# Patient Record
Sex: FEMALE | Race: WHITE | Employment: PART TIME | URBAN - METROPOLITAN AREA
[De-identification: names, ages, dates, MRNs, and addresses within clinical notes are randomized per-mention and may not be internally consistent; named-entity substitution may affect disease eponyms.]

---

## 2017-07-21 PROBLEM — R55 SYNCOPE: Status: ACTIVE | Noted: 2017-07-21

## 2017-07-21 PROBLEM — E66.01 MORBID OBESITY DUE TO EXCESS CALORIES (HCC): Status: ACTIVE | Noted: 2017-07-21

## 2017-07-21 PROBLEM — E78.2 MIXED HYPERLIPIDEMIA: Status: ACTIVE | Noted: 2017-07-21

## 2017-07-21 PROBLEM — R00.2 PALPITATIONS: Status: ACTIVE | Noted: 2017-07-21

## 2017-07-21 PROBLEM — E11.9 TYPE 2 DIABETES MELLITUS WITHOUT COMPLICATION, WITHOUT LONG-TERM CURRENT USE OF INSULIN (HCC): Status: ACTIVE | Noted: 2017-07-21

## 2017-12-11 PROBLEM — D25.9 FIBROID UTERUS: Status: ACTIVE | Noted: 2017-12-11

## 2017-12-12 PROBLEM — D25.9 FIBROID UTERUS: Status: RESOLVED | Noted: 2017-12-11 | Resolved: 2017-12-12

## 2018-10-21 ENCOUNTER — HOSPITAL ENCOUNTER (EMERGENCY)
Age: 48
Discharge: HOME | End: 2018-10-21
Payer: MEDICAID

## 2018-10-21 ENCOUNTER — APPOINTMENT (OUTPATIENT)
Dept: CT IMAGING | Age: 48
End: 2018-10-21
Payer: MEDICAID

## 2018-10-21 VITALS
TEMPERATURE: 97.7 F | HEIGHT: 64 IN | WEIGHT: 256 LBS | HEART RATE: 96 BPM | SYSTOLIC BLOOD PRESSURE: 145 MMHG | RESPIRATION RATE: 18 BRPM | DIASTOLIC BLOOD PRESSURE: 82 MMHG | OXYGEN SATURATION: 98 % | BODY MASS INDEX: 43.71 KG/M2

## 2018-10-21 DIAGNOSIS — F07.81 POST CONCUSSION SYNDROME: Primary | ICD-10-CM

## 2018-10-21 PROCEDURE — 99283 EMERGENCY DEPT VISIT LOW MDM: CPT

## 2018-10-21 PROCEDURE — 6370000000 HC RX 637 (ALT 250 FOR IP): Performed by: PHYSICIAN ASSISTANT

## 2018-10-21 PROCEDURE — 70450 CT HEAD/BRAIN W/O DYE: CPT

## 2018-10-21 PROCEDURE — 72125 CT NECK SPINE W/O DYE: CPT

## 2018-10-21 RX ORDER — ACETAMINOPHEN 500 MG
1000 TABLET ORAL EVERY 8 HOURS PRN
Qty: 30 TABLET | Refills: 3 | Status: SHIPPED | OUTPATIENT
Start: 2018-10-21 | End: 2020-08-25 | Stop reason: ALTCHOICE

## 2018-10-21 RX ORDER — ONDANSETRON 4 MG/1
4 TABLET, ORALLY DISINTEGRATING ORAL EVERY 8 HOURS PRN
Qty: 24 TABLET | Refills: 0 | Status: SHIPPED | OUTPATIENT
Start: 2018-10-21 | End: 2020-08-25 | Stop reason: ALTCHOICE

## 2018-10-21 RX ORDER — NAPROXEN 500 MG/1
500 TABLET ORAL 2 TIMES DAILY
Qty: 30 TABLET | Refills: 0 | Status: SHIPPED | OUTPATIENT
Start: 2018-10-21 | End: 2020-08-25 | Stop reason: ALTCHOICE

## 2018-10-21 RX ORDER — ACETAMINOPHEN 500 MG
1000 TABLET ORAL ONCE
Status: COMPLETED | OUTPATIENT
Start: 2018-10-21 | End: 2018-10-21

## 2018-10-21 RX ADMIN — ACETAMINOPHEN 1000 MG: 500 TABLET, FILM COATED ORAL at 15:47

## 2018-10-21 ASSESSMENT — PAIN DESCRIPTION - PAIN TYPE: TYPE: ACUTE PAIN

## 2018-10-21 ASSESSMENT — PAIN SCALES - GENERAL
PAINLEVEL_OUTOF10: 10

## 2018-10-21 ASSESSMENT — PAIN DESCRIPTION - LOCATION: LOCATION: HEAD

## 2018-10-21 NOTE — ED PROVIDER NOTES
[facility]     Department of Emergency Medicine   ED  Provider Note  Admit Date/RoomTime: 10/21/2018  2:17 PM  ED Room: 20/20  Chief Complaint   Fall (Patient lost her balance causing her to fall and hit the back of her head, patient doesn't think that she passed out.) and Nausea    History of Present Illness   Source of history provided by:  patient. History/Exam Limitations: none. Carmella Dee is a 46 y.o. old female with a past medical history of:   Past Medical History:   Diagnosis Date    Diabetes mellitus ([facility] [address]     Hyperlipidemia     Kidney stone     Syncopal episodes     vasodepressor mediated origin      presents to the emergency department by private vehicle, for head injury which occured 1 day(s) prior to arrival.  Mechanism of injury/pain: slipped on water and hit the back of her head. Loss of consciousness did not occur. The injury has been associated with headache, nausea, vomiting, dizziness and ataxia and denies any chest pain, palpitations, vertigo, blurred vision, diplopia, loss of vision, slurred speech, focal weakness, focal sensory loss, neck pain, incontinence or seizure activity. Previous head injury: no.  Since onset the symptoms have been severe in degree. Her pain is aggraveated by \"everything\" and relieved by nothing. She has a history of no anticoagulation use. The patients tetanus status is up to date. ROS    Pertinent positives and negatives are stated within HPI, all other systems reviewed and are negative. Past Surgical History:  has a past surgical history that includes Cholecystectomy; Appendectomy; Tubal ligation (1991); other surgical history (12/11/2017); and Hysterectomy. Social History:  reports that she has never smoked. She has never used smokeless tobacco. She reports that she drinks alcohol. She reports that she does not use drugs. Family History: family history includes Bipolar Disorder in her brother; Heart Disease in her mother;  Other in her

## 2020-08-25 ENCOUNTER — APPOINTMENT (OUTPATIENT)
Dept: GENERAL RADIOLOGY | Age: 50
End: 2020-08-25
Payer: COMMERCIAL

## 2020-08-25 ENCOUNTER — HOSPITAL ENCOUNTER (EMERGENCY)
Age: 50
Discharge: HOME | End: 2020-08-25
Attending: EMERGENCY MEDICINE
Payer: COMMERCIAL

## 2020-08-25 VITALS
SYSTOLIC BLOOD PRESSURE: 151 MMHG | HEART RATE: 74 BPM | TEMPERATURE: 98 F | DIASTOLIC BLOOD PRESSURE: 96 MMHG | BODY MASS INDEX: 37.56 KG/M2 | RESPIRATION RATE: 16 BRPM | HEIGHT: 64 IN | WEIGHT: 220 LBS | OXYGEN SATURATION: 96 %

## 2020-08-25 LAB
ALBUMIN SERPL-MCNC: 3.8 G/DL (ref 3.5–5.2)
ALP BLD-CCNC: 105 U/L (ref 35–104)
ALT SERPL-CCNC: 21 U/L (ref 0–32)
ANION GAP SERPL CALCULATED.3IONS-SCNC: 11 MMOL/L (ref 7–16)
AST SERPL-CCNC: 26 U/L (ref 0–31)
BASOPHILS ABSOLUTE: 0.06 E9/L (ref 0–0.2)
BASOPHILS RELATIVE PERCENT: 0.7 % (ref 0–2)
BILIRUB SERPL-MCNC: 0.2 MG/DL (ref 0–1.2)
BUN BLDV-MCNC: 13 MG/DL (ref 6–20)
CALCIUM SERPL-MCNC: 9 MG/DL (ref 8.6–10.2)
CHLORIDE BLD-SCNC: 102 MMOL/L (ref 98–107)
CO2: 25 MMOL/L (ref 22–29)
CREAT SERPL-MCNC: 0.6 MG/DL (ref 0.5–1)
EOSINOPHILS ABSOLUTE: 0.45 E9/L (ref 0.05–0.5)
EOSINOPHILS RELATIVE PERCENT: 5.3 % (ref 0–6)
GFR AFRICAN AMERICAN: >60
GFR NON-AFRICAN AMERICAN: >60 ML/MIN/1.73
GLUCOSE BLD-MCNC: 217 MG/DL (ref 74–99)
HCT VFR BLD CALC: 46.7 % (ref 34–48)
HEMOGLOBIN: 15 G/DL (ref 11.5–15.5)
IMMATURE GRANULOCYTES #: 0.03 E9/L
IMMATURE GRANULOCYTES %: 0.4 % (ref 0–5)
LACTIC ACID: 2.2 MMOL/L (ref 0.5–2.2)
LYMPHOCYTES ABSOLUTE: 2.48 E9/L (ref 1.5–4)
LYMPHOCYTES RELATIVE PERCENT: 29.5 % (ref 20–42)
MCH RBC QN AUTO: 30.9 PG (ref 26–35)
MCHC RBC AUTO-ENTMCNC: 32.1 % (ref 32–34.5)
MCV RBC AUTO: 96.1 FL (ref 80–99.9)
MONOCYTES ABSOLUTE: 0.38 E9/L (ref 0.1–0.95)
MONOCYTES RELATIVE PERCENT: 4.5 % (ref 2–12)
NEUTROPHILS ABSOLUTE: 5.02 E9/L (ref 1.8–7.3)
NEUTROPHILS RELATIVE PERCENT: 59.6 % (ref 43–80)
PDW BLD-RTO: 12.9 FL (ref 11.5–15)
PLATELET # BLD: 287 E9/L (ref 130–450)
PMV BLD AUTO: 10.6 FL (ref 7–12)
POTASSIUM REFLEX MAGNESIUM: 4.6 MMOL/L (ref 3.5–5)
RBC # BLD: 4.86 E12/L (ref 3.5–5.5)
SEDIMENTATION RATE, ERYTHROCYTE: 1 MM/HR (ref 0–20)
SODIUM BLD-SCNC: 138 MMOL/L (ref 132–146)
TOTAL PROTEIN: 7 G/DL (ref 6.4–8.3)
WBC # BLD: 8.4 E9/L (ref 4.5–11.5)

## 2020-08-25 PROCEDURE — 87040 BLOOD CULTURE FOR BACTERIA: CPT

## 2020-08-25 PROCEDURE — 36415 COLL VENOUS BLD VENIPUNCTURE: CPT

## 2020-08-25 PROCEDURE — 85651 RBC SED RATE NONAUTOMATED: CPT

## 2020-08-25 PROCEDURE — 85025 COMPLETE CBC W/AUTO DIFF WBC: CPT

## 2020-08-25 PROCEDURE — 80053 COMPREHEN METABOLIC PANEL: CPT

## 2020-08-25 PROCEDURE — 6370000000 HC RX 637 (ALT 250 FOR IP): Performed by: PHYSICIAN ASSISTANT

## 2020-08-25 PROCEDURE — 99284 EMERGENCY DEPT VISIT MOD MDM: CPT

## 2020-08-25 PROCEDURE — 73630 X-RAY EXAM OF FOOT: CPT

## 2020-08-25 PROCEDURE — 83605 ASSAY OF LACTIC ACID: CPT

## 2020-08-25 RX ORDER — M-VIT,TX,IRON,MINS/CALC/FOLIC 27MG-0.4MG
1 TABLET ORAL DAILY
COMMUNITY

## 2020-08-25 RX ORDER — OXYCODONE HYDROCHLORIDE AND ACETAMINOPHEN 5; 325 MG/1; MG/1
1 TABLET ORAL ONCE
Status: COMPLETED | OUTPATIENT
Start: 2020-08-25 | End: 2020-08-25

## 2020-08-25 RX ORDER — MORPHINE SULFATE 4 MG/ML
4 INJECTION, SOLUTION INTRAMUSCULAR; INTRAVENOUS ONCE
Status: DISCONTINUED | OUTPATIENT
Start: 2020-08-25 | End: 2020-08-25

## 2020-08-25 RX ORDER — NAPROXEN 250 MG/1
250 TABLET ORAL 2 TIMES DAILY WITH MEALS
COMMUNITY

## 2020-08-25 RX ORDER — SULFAMETHOXAZOLE AND TRIMETHOPRIM 800; 160 MG/1; MG/1
1 TABLET ORAL 2 TIMES DAILY
Qty: 20 TABLET | Refills: 0 | Status: SHIPPED | OUTPATIENT
Start: 2020-08-25 | End: 2020-09-04

## 2020-08-25 RX ORDER — CEPHALEXIN 500 MG/1
500 CAPSULE ORAL 4 TIMES DAILY
Qty: 40 CAPSULE | Refills: 0 | Status: SHIPPED | OUTPATIENT
Start: 2020-08-25 | End: 2020-09-04

## 2020-08-25 RX ORDER — ONDANSETRON 2 MG/ML
4 INJECTION INTRAMUSCULAR; INTRAVENOUS ONCE
Status: DISCONTINUED | OUTPATIENT
Start: 2020-08-25 | End: 2020-08-25

## 2020-08-25 RX ORDER — PAROXETINE 10 MG/1
15 TABLET, FILM COATED ORAL EVERY MORNING
COMMUNITY

## 2020-08-25 RX ORDER — ROSUVASTATIN CALCIUM 10 MG/1
10 TABLET, COATED ORAL NIGHTLY
COMMUNITY

## 2020-08-25 RX ORDER — GABAPENTIN 300 MG/1
900 CAPSULE ORAL 3 TIMES DAILY
COMMUNITY

## 2020-08-25 RX ORDER — TRAMADOL HYDROCHLORIDE 50 MG/1
50 TABLET ORAL EVERY 4 HOURS PRN
Qty: 18 TABLET | Refills: 0 | Status: SHIPPED | OUTPATIENT
Start: 2020-08-25 | End: 2020-08-28

## 2020-08-25 RX ADMIN — OXYCODONE HYDROCHLORIDE AND ACETAMINOPHEN 1 TABLET: 5; 325 TABLET ORAL at 13:11

## 2020-08-25 ASSESSMENT — PAIN SCALES - GENERAL
PAINLEVEL_OUTOF10: 10
PAINLEVEL_OUTOF10: 9
PAINLEVEL_OUTOF10: 8

## 2020-08-25 ASSESSMENT — PAIN DESCRIPTION - ORIENTATION: ORIENTATION: RIGHT

## 2020-08-25 ASSESSMENT — PAIN DESCRIPTION - LOCATION: LOCATION: FOOT

## 2020-08-25 NOTE — ED PROVIDER NOTES
ED Attending  CC: No    HPI:  8/25/20,   Time: 11:41 AM EDT       Carmelo Stanford is a 44 y.o. female presenting to the ED for right great toe pain, beginning 1 week ago. The complaint has been persistent, moderate in severity, and worsened by palpation and walking. Patient states that she picked a piece of dead skin off of the plantar aspect of her right great toe approximately a week ago. Since that time she has been having worsening swelling, erythema, and pain to the area. Patient states that now that the pain has traveled to the second toe and up the dorsal aspect of the first metatarsal.  She was evaluated by her PCP today who recommended ED evaluation for hospital admission IV antibiotics to rule out osteomyelitis. She has been afebrile at home without recent travel or sick contacts. No drainage from the area. Patient denies all other symptoms at this time. Review of Systems:   Pertinent positives and negatives are stated within HPI, all other systems reviewed and are negative.          --------------------------------------------- PAST HISTORY ---------------------------------------------  Past Medical History:  has a past medical history of Diabetes mellitus ([facility] [address] Hyperlipidemia, Kidney stone, and Syncopal episodes. Past Surgical History:  has a past surgical history that includes Cholecystectomy; Appendectomy; Tubal ligation (1991); other surgical history (12/11/2017); and Hysterectomy. Social History:  reports that she has never smoked. She has never used smokeless tobacco. She reports current alcohol use. She reports that she does not use drugs. Family History: family history includes Bipolar Disorder in her brother; Heart Disease in her mother; Other in her brother and mother; Schizophrenia in her brother. The patients home medications have been reviewed. Allergies: Patient has no known allergies.         ---------------------------------------------------PHYSICAL EXAM--------------------------------------    Constitutional/General: Alert and oriented x3, well appearing, non toxic in NAD  Head: Normocephalic and atraumatic  Eyes: PERRL, EOMI, conjunctive normal, sclera non icteric  Mouth: Oropharynx clear, handling secretions, no trismus, no asymmetry of the posterior oropharynx or uvular edema  Neck: Supple, full ROM, non tender to palpation in the midline, no stridor, no crepitus, no meningeal signs  Respiratory: Lungs clear to auscultation bilaterally, no wheezes, rales, or rhonchi. Not in respiratory distress  Cardiovascular:  Regular rate. Regular rhythm. No murmurs, gallops, or rubs. 2+ distal pulses  Chest: No chest wall tenderness  GI:  Abdomen Soft, Non tender, Non distended. +BS. No organomegaly, no palpable masses,  No rebound, guarding, or rigidity. Musculoskeletal: Moves all extremities x 4. Warm and well perfused, no clubbing, cyanosis, or edema. Capillary refill <3 seconds, open wound to the plantar aspect of the medial right great toe without drainage however there is surrounding erythema and swelling extending to the MTP joint  Integument: skin warm and dry. No rashes. Lymphatic: no lymphadenopathy noted  Neurologic: GCS 15, no focal deficits, symmetric strength 5/5 in the upper and lower extremities bilaterally  Psychiatric: Normal Affect    -------------------------------------------------- RESULTS -------------------------------------------------  I have personally reviewed all laboratory and imaging results for this patient. Results are listed below.      LABS:  Results for orders placed or performed during the hospital encounter of 08/25/20   CBC Auto Differential   Result Value Ref Range    WBC 8.4 4.5 - 11.5 E9/L    RBC 4.86 3.50 - 5.50 E12/L    Hemoglobin 15.0 11.5 - 15.5 g/dL    Hematocrit 46.7 34.0 - 48.0 %    MCV 96.1 80.0 - 99.9 fL    MCH 30.9 26.0 - 35.0 pg    MCHC 32.1 32.0 - 34.5 %    RDW 12.9 11.5 - 15.0 fL    Platelets 413 380 - 491 E9/L MPV 10.6 7.0 - 12.0 fL    Neutrophils % 59.6 43.0 - 80.0 %    Immature Granulocytes % 0.4 0.0 - 5.0 %    Lymphocytes % 29.5 20.0 - 42.0 %    Monocytes % 4.5 2.0 - 12.0 %    Eosinophils % 5.3 0.0 - 6.0 %    Basophils % 0.7 0.0 - 2.0 %    Neutrophils Absolute 5.02 1.80 - 7.30 E9/L    Immature Granulocytes # 0.03 E9/L    Lymphocytes Absolute 2.48 1.50 - 4.00 E9/L    Monocytes Absolute 0.38 0.10 - 0.95 E9/L    Eosinophils Absolute 0.45 0.05 - 0.50 E9/L    Basophils Absolute 0.06 0.00 - 0.20 E9/L   Comprehensive Metabolic Panel w/ Reflex to MG   Result Value Ref Range    Sodium 138 132 - 146 mmol/L    Potassium reflex Magnesium 4.6 3.5 - 5.0 mmol/L    Chloride 102 98 - 107 mmol/L    CO2 25 22 - 29 mmol/L    Anion Gap 11 7 - 16 mmol/L    Glucose 217 (H) 74 - 99 mg/dL    BUN 13 6 - 20 mg/dL    CREATININE 0.6 0.5 - 1.0 mg/dL    GFR Non-African American >60 >=60 mL/min/1.73    GFR African American >60     Calcium 9.0 8.6 - 10.2 mg/dL    Total Protein 7.0 6.4 - 8.3 g/dL    Alb 3.8 3.5 - 5.2 g/dL    Total Bilirubin 0.2 0.0 - 1.2 mg/dL    Alkaline Phosphatase 105 (H) 35 - 104 U/L    ALT 21 0 - 32 U/L    AST 26 0 - 31 U/L   Lactic Acid, Plasma   Result Value Ref Range    Lactic Acid 2.2 0.5 - 2.2 mmol/L   Sedimentation Rate   Result Value Ref Range    Sed Rate 1 0 - 20 mm/Hr       RADIOLOGY:  Interpreted by Radiologist.  XR FOOT RIGHT (MIN 3 VIEWS)   Final Result   No radiographic evidence of osteomyelitis.                 ------------------------- NURSING NOTES AND VITALS REVIEWED ---------------------------   The nursing notes within the ED encounter and vital signs as below have been reviewed by myself. BP (!) 151/96   Pulse 74   Temp 98 °F (36.7 °C) (Oral)   Resp 16   Ht 5' 4\" (1.626 m)   Wt 220 lb (99.8 kg)   LMP 11/24/2017   SpO2 96%   BMI 37.76 kg/m²   Oxygen Saturation Interpretation: Normal    The patients available past medical records and past encounters were reviewed. ------------------------------ ED COURSE/MEDICAL DECISION MAKING----------------------  Medications   oxyCODONE-acetaminophen (PERCOCET) 5-325 MG per tablet 1 tablet (1 tablet Oral Given 8/25/20 1311)         ED COURSE:  ED Course as of Aug 25 1459   Tue Aug 25, 2020   1451 Discussed patient with Dr. Marlen Aguilar. Agreeable to outpatient antibiotics prior to hospital admission. Requests podiatry follow up. [MS]      ED Course User Index  [MS] Carolyn Elias [place]       Medical Decision Making:    Patient is a 47-year-old female presenting to the emergency department with a right great toe wound. Patient does not have leukocytosis, lactate negative, sed rate is 1. Imaging studies do not reveal any evidence of osteomyelitis. At this time I believe this is a localized cellulitis and can be treated on an outpatient basis. Patient's PCP was also contacted and is agreeable to this plan. Podiatry follow-up provided to the patient. Advised to follow-up with podiatry as discussed and return to the emergency department with any new or worsening symptoms. Will initiate Bactrim and Keflex for antibiotic coverage and tramadol as needed for pain. Patient voiced understanding is agreeable to the above treatment plan. This patient's ED course included: a personal history and physicial examination and multiple bedside re-evaluations    This patient has remained hemodynamically stable during their ED course. Re-Evaluations:             Re-evaluation. Patients symptoms are improving    Re-examination  8/25/20   11:41 AM EDT          Vital Signs:   Vitals:    08/25/20 1114 08/25/20 1447   BP: (!) 180/115 (!) 151/96   Pulse: 108 74   Resp: 18 16   Temp: 98.1 °F (36.7 °C) 98 °F (36.7 °C)   TempSrc: Infrared Oral   SpO2: 98% 96%   Weight: 220 lb (99.8 kg)    Height: 5' 4\" (1.626 m)        Consultations:             Dr. Marlen Aguilar: agreeable to outpatient treatment with PO antibiotics. Podiatry follow up.     Critical Care: None        Counseling: The emergency provider has spoken with the patient and discussed todays results, in addition to providing specific details for the plan of care and counseling regarding the diagnosis and prognosis. Questions are answered at this time and they are agreeable with the plan.       --------------------------------- IMPRESSION AND DISPOSITION ---------------------------------    IMPRESSION  1. Cellulitis of great toe of right foot        DISPOSITION  Disposition: Discharge to home  Patient condition is stable    NOTE: This report was transcribed using voice recognition software.  Every effort was made to ensure accuracy; however, inadvertent computerized transcription errors may be present        AnyLm Sullivan  08/25/20 1195

## 2020-08-25 NOTE — ED NOTES
Pt. C/o R. Great toe pain and redness for the past week. Pt. states they picked a piece of skin off of there toe a week ago.      Ange Bailey RN  08/25/20 3887

## 2020-08-30 LAB
BLOOD CULTURE, ROUTINE: NORMAL
CULTURE, BLOOD 2: NORMAL

## 2022-12-30 ENCOUNTER — HOSPITAL ENCOUNTER (EMERGENCY)
Age: 52
Discharge: HOME | End: 2022-12-30
Payer: COMMERCIAL

## 2022-12-30 VITALS
OXYGEN SATURATION: 95 % | DIASTOLIC BLOOD PRESSURE: 84 MMHG | SYSTOLIC BLOOD PRESSURE: 125 MMHG | HEIGHT: 64 IN | RESPIRATION RATE: 18 BRPM | TEMPERATURE: 98 F | HEART RATE: 106 BPM | WEIGHT: 20 LBS | BODY MASS INDEX: 3.41 KG/M2

## 2022-12-30 DIAGNOSIS — J02.9 ACUTE PHARYNGITIS, UNSPECIFIED ETIOLOGY: ICD-10-CM

## 2022-12-30 DIAGNOSIS — J06.9 ACUTE UPPER RESPIRATORY INFECTION: Primary | ICD-10-CM

## 2022-12-30 LAB
INFLUENZA A BY PCR: NOT DETECTED
INFLUENZA B BY PCR: NOT DETECTED
SARS-COV-2, NAAT: NOT DETECTED

## 2022-12-30 PROCEDURE — 87635 SARS-COV-2 COVID-19 AMP PRB: CPT

## 2022-12-30 PROCEDURE — 6370000000 HC RX 637 (ALT 250 FOR IP): Performed by: NURSE PRACTITIONER

## 2022-12-30 PROCEDURE — 87502 INFLUENZA DNA AMP PROBE: CPT

## 2022-12-30 PROCEDURE — 99211 OFF/OP EST MAY X REQ PHY/QHP: CPT

## 2022-12-30 RX ORDER — AMOXICILLIN 250 MG/1
500 CAPSULE ORAL ONCE
Status: COMPLETED | OUTPATIENT
Start: 2022-12-30 | End: 2022-12-30

## 2022-12-30 RX ORDER — AMOXICILLIN 500 MG/1
500 CAPSULE ORAL 3 TIMES DAILY
Qty: 30 CAPSULE | Refills: 0 | Status: SHIPPED | OUTPATIENT
Start: 2022-12-30 | End: 2023-01-09

## 2022-12-30 RX ADMIN — AMOXICILLIN 500 MG: 250 CAPSULE ORAL at 21:17

## 2022-12-30 ASSESSMENT — PAIN SCALES - GENERAL: PAINLEVEL_OUTOF10: 0

## 2022-12-30 ASSESSMENT — PAIN - FUNCTIONAL ASSESSMENT: PAIN_FUNCTIONAL_ASSESSMENT: NONE - DENIES PAIN

## 2022-12-31 NOTE — ED PROVIDER NOTES
Department of Emergency . [facility] [address] [facility]  Provider Note  Admit Date/RoomTime: 2022  8:03 PM  Room:   NAME: Tommy Tijerina  : 1970  MRN: [id number]     Chief Complaint:  Cough, Pharyngitis, Fever, Fatigue, Diarrhea (Cough, sore throat, fatigue, fever started Tuesday ), and Nausea    History of Present Illness       Tommy Tijerina is a 45 y.o. old female who presents to the urgent care center by private vehicle for relation. She is complaining of a cough sore throat fever fatigue and diarrhea she has been sick for 3 days. She said her friend tested positive for COVID. They were on vacation together apparently. SHe tested for COVID at home and it was negative. S He is not complaining of pain over her sinuses or pressure over her sinuses. Pertinent positives and negatives are stated within HPI, all other systems reviewed and are negative. Past Surgical History:   Procedure Laterality Date    APPENDECTOMY      CHOLECYSTECTOMY      HYSTERECTOMY (CERVIX STATUS UNKNOWN)      OTHER SURGICAL HISTORY  2017    robotic assisted laparoscopic total abdominal hysterectomy with bilateral salpinectomy and cystoscopy    TUBAL LIGATION     Social History:  reports that she has never smoked. She has never used smokeless tobacco. She reports current alcohol use. She reports that she does not use drugs. Family History: family history includes Bipolar Disorder in her brother; Heart Disease in her mother; Other in her brother and mother; Schizophrenia in her brother. Allergies: Patient has no known allergies.     Physical Exam            ED Triage Vitals [22]   BP Temp Temp Source Heart Rate Resp SpO2 Height Weight   125/84 98 °F (36.7 °C) Infrared (!) 106 18 95 % 5' 4\" (1.626 m) 20 lb (9.072 kg)      Oxygen Saturation Interpretation: Normal.    Constitutional:  Alert, appears well, no distress  Ears:  External Ears: Bilateral normal. TM's & External Canals: normal appearance, normal TMs bilaterally. Nose:   There is no discharge, swelling or lesions noted. Sinuses: no bilateral maxillary sinus tenderness. no bilateral frontal sinus tenderness. Mouth:  normal tongue and buccal mucosa. Throat: mild erythema or exudates noted. Teeth and gums normal..  Airway patent. Neck/Lymphatics:  Neck Supple. Respiratory:   Breath sounds: bilateral normal.  Lung sounds: normal.   CV:  Regular rate and rhythm, normal heart sounds, without pathological murmurs, ectopy, gallops, or rubs. GI:  Abdomen Soft, nontender, good bowel sounds. No firm or pulsatile mass. Integument:  Normal turgor. Warm, dry, without visible rash. Neurological:  Oriented. Motor functions intact. Lab / Imaging Results   (All laboratory and radiology results have been personally reviewed by myself)  Labs:  Results for orders placed or performed during the hospital encounter of 12/30/22   Rapid influenza A/B antigens    Specimen: Nares   Result Value Ref Range    Influenza A by PCR Not Detected Not Detected    Influenza B by PCR Not Detected Not Detected   COVID-19, Rapid    Specimen: Nasopharyngeal Swab   Result Value Ref Range    SARS-CoV-2, NAAT Not Detected Not Detected     Imaging: All Radiology results interpreted by Radiologist unless otherwise noted. No orders to display     ED Course / Medical Decision Making   Medications - No data to display       MDM:   She has been sick for 3 days she says she has had a fever sore throat body aches cough some diarrhea does not have any abdominal pain is not complaining of chest pain. Is not complaining of shortness of breath I did recheck her sat when I was in the room it was 97% and her heart rate was 95. She tested for COVID at home but does not think she did have a proper test I did test her for influenza and it was negative I then tested her for COVID and it was negative.   I did not test for strep throat but she said that she her throat is very sore and she has had a fever so I did cover her for strep throat with amoxicillin advised her to follow-up with her doctor if anything worsen she should get reevaluated    1. Acute upper respiratory infection    2. Acute pharyngitis, unspecified etiology      Plan   Discharge to home and advised to contact Teodroo Car., DO  [address] [place] [address] [phone]    Schedule an appointment as soon as possible for a visit    Patient condition is good    New Medications     New Prescriptions    AMOXICILLIN (AMOXIL) 500 MG CAPSULE    Take 1 capsule by mouth 3 times daily for 10 days     Electronically signed by RAINE Serrato CNP   DD: 12/30/22  **This report was transcribed using voice recognition software. Every effort was made to ensure accuracy; however, inadvertent computerized transcription errors may be present.   END OF ED PROVIDER NOTE      RAINE Serrato CNP  12/30/22 2506

## 2023-03-08 ENCOUNTER — HOSPITAL ENCOUNTER (OUTPATIENT)
Dept: CT IMAGING | Age: 53
Discharge: HOME | End: 2023-03-08
Payer: COMMERCIAL

## 2023-03-08 DIAGNOSIS — R10.10 UPPER ABDOMINAL PAIN: ICD-10-CM

## 2023-03-08 LAB
BUN BLDV-MCNC: 13 MG/DL (ref 6–20)
CREAT SERPL-MCNC: 0.7 MG/DL (ref 0.5–1)
GFR SERPL CREATININE-BSD FRML MDRD: >60 ML/MIN/1.73

## 2023-03-08 PROCEDURE — 36415 COLL VENOUS BLD VENIPUNCTURE: CPT

## 2023-03-08 PROCEDURE — 74177 CT ABD & PELVIS W/CONTRAST: CPT

## 2023-03-08 PROCEDURE — 84520 ASSAY OF UREA NITROGEN: CPT

## 2023-03-08 PROCEDURE — 82565 ASSAY OF CREATININE: CPT

## 2023-03-08 PROCEDURE — 6360000004 HC RX CONTRAST MEDICATION: Performed by: RADIOLOGY

## 2023-03-08 RX ADMIN — IOPAMIDOL 75 ML: 755 INJECTION, SOLUTION INTRAVENOUS at 13:18

## 2023-03-08 RX ADMIN — IOPAMIDOL 18 ML: 755 INJECTION, SOLUTION INTRAVENOUS at 13:18

## 2024-10-01 ENCOUNTER — HOSPITAL ENCOUNTER (OUTPATIENT)
Dept: WOUND CARE | Age: 54
Discharge: HOME | End: 2024-10-01
Payer: COMMERCIAL

## 2024-10-01 VITALS
RESPIRATION RATE: 16 BRPM | SYSTOLIC BLOOD PRESSURE: 117 MMHG | WEIGHT: 204 LBS | HEART RATE: 75 BPM | DIASTOLIC BLOOD PRESSURE: 75 MMHG | BODY MASS INDEX: 34.83 KG/M2 | TEMPERATURE: 96.6 F | HEIGHT: 64 IN

## 2024-10-01 DIAGNOSIS — B35.6 TINEA CRURIS: ICD-10-CM

## 2024-10-01 DIAGNOSIS — R09.89 DECREASED DORSALIS PEDIS PULSE: ICD-10-CM

## 2024-10-01 DIAGNOSIS — E11.621 DIABETIC ULCER OF TOE OF RIGHT FOOT ASSOCIATED WITH TYPE 2 DIABETES MELLITUS, WITH FAT LAYER EXPOSED (HCC): Primary | ICD-10-CM

## 2024-10-01 DIAGNOSIS — B35.3 TINEA PEDIS OF BOTH FEET: ICD-10-CM

## 2024-10-01 DIAGNOSIS — L97.512 DIABETIC ULCER OF TOE OF RIGHT FOOT ASSOCIATED WITH TYPE 2 DIABETES MELLITUS, WITH FAT LAYER EXPOSED (HCC): Primary | ICD-10-CM

## 2024-10-01 PROBLEM — R00.2 PALPITATIONS: Status: RESOLVED | Noted: 2017-07-21 | Resolved: 2024-10-01

## 2024-10-01 PROBLEM — R55 SYNCOPE: Status: RESOLVED | Noted: 2017-07-21 | Resolved: 2024-10-01

## 2024-10-01 PROCEDURE — 99204 OFFICE O/P NEW MOD 45 MIN: CPT | Performed by: SURGERY

## 2024-10-01 PROCEDURE — 99203 OFFICE O/P NEW LOW 30 MIN: CPT

## 2024-10-01 PROCEDURE — 11042 DBRDMT SUBQ TIS 1ST 20SQCM/<: CPT | Performed by: SURGERY

## 2024-10-01 PROCEDURE — 11042 DBRDMT SUBQ TIS 1ST 20SQCM/<: CPT

## 2024-10-01 RX ORDER — LIDOCAINE HYDROCHLORIDE 40 MG/ML
SOLUTION TOPICAL ONCE
OUTPATIENT
Start: 2024-10-01 | End: 2024-10-01

## 2024-10-01 RX ORDER — TIRZEPATIDE 10 MG/.5ML
10 INJECTION, SOLUTION SUBCUTANEOUS WEEKLY
COMMUNITY
Start: 2024-09-12

## 2024-10-01 RX ORDER — BACITRACIN ZINC AND POLYMYXIN B SULFATE 500; 1000 [USP'U]/G; [USP'U]/G
OINTMENT TOPICAL ONCE
OUTPATIENT
Start: 2024-10-01 | End: 2024-10-01

## 2024-10-01 RX ORDER — MUPIROCIN 20 MG/G
OINTMENT TOPICAL ONCE
OUTPATIENT
Start: 2024-10-01 | End: 2024-10-01

## 2024-10-01 RX ORDER — TERBINAFINE HYDROCHLORIDE 250 MG/1
250 TABLET ORAL DAILY
Qty: 20 TABLET | Refills: 0 | Status: SHIPPED | OUTPATIENT
Start: 2024-10-01 | End: 2024-10-21

## 2024-10-01 RX ORDER — ESCITALOPRAM OXALATE 10 MG/1
20 TABLET ORAL DAILY
COMMUNITY

## 2024-10-01 RX ORDER — LIDOCAINE HYDROCHLORIDE 20 MG/ML
JELLY TOPICAL ONCE
OUTPATIENT
Start: 2024-10-01 | End: 2024-10-01

## 2024-10-01 RX ORDER — DOXYCYCLINE 100 MG/1
100 CAPSULE ORAL 2 TIMES DAILY
COMMUNITY
Start: 2024-09-23 | End: 2024-10-03

## 2024-10-01 RX ORDER — LIDOCAINE 50 MG/G
OINTMENT TOPICAL ONCE
OUTPATIENT
Start: 2024-10-01 | End: 2024-10-01

## 2024-10-01 RX ORDER — BACITRACIN ZINC 500 [USP'U]/G
OINTMENT TOPICAL ONCE
OUTPATIENT
Start: 2024-10-01 | End: 2024-10-01

## 2024-10-01 RX ORDER — OMEPRAZOLE 40 MG/1
40 CAPSULE, DELAYED RELEASE ORAL DAILY
COMMUNITY

## 2024-10-01 RX ORDER — NEOMYCIN/BACITRACIN/POLYMYXINB 3.5-400-5K
OINTMENT (GRAM) TOPICAL ONCE
OUTPATIENT
Start: 2024-10-01 | End: 2024-10-01

## 2024-10-01 RX ORDER — GENTAMICIN SULFATE 1 MG/G
OINTMENT TOPICAL ONCE
OUTPATIENT
Start: 2024-10-01 | End: 2024-10-01

## 2024-10-01 RX ORDER — TRIAMCINOLONE ACETONIDE 1 MG/G
OINTMENT TOPICAL ONCE
OUTPATIENT
Start: 2024-10-01 | End: 2024-10-01

## 2024-10-01 RX ORDER — CLOBETASOL PROPIONATE 0.5 MG/G
OINTMENT TOPICAL ONCE
OUTPATIENT
Start: 2024-10-01 | End: 2024-10-01

## 2024-10-01 RX ORDER — SILVER SULFADIAZINE 10 MG/G
CREAM TOPICAL ONCE
OUTPATIENT
Start: 2024-10-01 | End: 2024-10-01

## 2024-10-01 RX ORDER — SODIUM CHLOR/HYPOCHLOROUS ACID 0.033 %
SOLUTION, IRRIGATION IRRIGATION ONCE
OUTPATIENT
Start: 2024-10-01 | End: 2024-10-01

## 2024-10-01 RX ORDER — BETAMETHASONE DIPROPIONATE 0.5 MG/G
CREAM TOPICAL ONCE
OUTPATIENT
Start: 2024-10-01 | End: 2024-10-01

## 2024-10-01 RX ORDER — LIDOCAINE 40 MG/G
CREAM TOPICAL ONCE
OUTPATIENT
Start: 2024-10-01 | End: 2024-10-01

## 2024-10-01 ASSESSMENT — PAIN DESCRIPTION - ORIENTATION: ORIENTATION: RIGHT

## 2024-10-01 ASSESSMENT — PAIN DESCRIPTION - DESCRIPTORS: DESCRIPTORS: NAGGING;THROBBING

## 2024-10-01 ASSESSMENT — PAIN DESCRIPTION - LOCATION: LOCATION: FOOT

## 2024-10-01 ASSESSMENT — PAIN SCALES - GENERAL: PAINLEVEL_OUTOF10: 7

## 2024-10-01 NOTE — PLAN OF CARE
Problem: Pain  Goal: Verbalizes/displays adequate comfort level or baseline comfort level  Outcome: Progressing     Problem: Cognitive:  Goal: Knowledge of wound care  Description: Knowledge of wound care  Outcome: Progressing  Goal: Understands risk factors for wounds  Description: Understands risk factors for wounds  Outcome: Progressing     Problem: Wound:  Goal: Will show signs of wound healing; wound closure and no evidence of infection  Description: Will show signs of wound healing; wound closure and no evidence of infection  Outcome: Progressing     Problem: Blood Glucose:  Goal: Ability to maintain appropriate glucose levels will improve  Description: Ability to maintain appropriate glucose levels will improve  Outcome: Progressing

## 2024-10-01 NOTE — DISCHARGE INSTRUCTIONS
Visit Discharge/Physician Orders    Discharge condition: Stable    Assessment of pain at discharge: yes    Anesthetic used: lido 4%    Discharge to: Home    Left via:Private automobile    Accompanied by: self    ECF/HHA: [facility]    Dressing Orders: To right foot wound- Cleanse with saline, apply zara, ABD pad and kerlix. Change daily.    Treatment Orders: Eat a diet high in protein and vitamin C. Take a multiple vitamin daily unless contraindicated.  Blood work ordered, have completed ASAP  Arterial and vascular studies ordered, to be scheduled   Miconazole Ointment prescription sent to pharmacy (may obtain on amazon, less expensive)- apply to feet, toes, and legs- avoiding wounds twice a day for one month   Lamisil tablets prescription sent to pharmacy- take by mouth as prescribed.     [facility] followup visit __2 weeks_______________________  (Please note your next appointment above and if you are unable to keep, kindly give a 24 hour notice. Thank you.)    Physician signature:__________________________      If you experience any of the following, please call the Wound Care Center during business hours:    * Increase in Pain  * Temperature over 101  * Increase in drainage from your wound  * Drainage with a foul odor  * Bleeding  * Increase in swelling  * Need for compression bandage changes due to slippage, breakthrough drainage.    If you need medical attention outside of the business hours of the Wound Care Centers please contact your PCP or go to the nearest emergency room.

## 2024-10-01 NOTE — PROGRESS NOTES
Wound Healing Center /Hyperbarics   History and Physical/Consultation  Vascular    Referring Physician : Felice Stack Jr.,   Mari HANKS  MEDICAL RECORD NUMBER:  [id number]  AGE: 54 y.o.   GENDER: female  : 1970  EPISODE DATE:  10/1/2024  Subjective:     Chief Complaint   Patient presents with    Wound Check         HISTORY of PRESENT ILLNESS HPI     Mari HANKS is a 54 y.o. female who presents today for wound/ulcer evaluation.   History of Wound Context:  The patient has had a wound of right foot mainly involving right great toe which was first noted approximately 6 months.  This has been treated by her pediatric surgeon, Dr. Appiah who referred the patient to wound care center for further evaluation.  On their initial visit to the wound healing center, 10/01/24, the patient has noted that the wound has not been improving.  The patient has not had similar previous wounds in the past.      Patient tells me, that she works in flip-flops, without any socks, denies any history of trauma no history of fever or chills    Patient does have history of diabetes mellitus diabetic neuropathy    Patient does not smoke    Pt is currently  on abx.  Doxycycline      Wound/Ulcer Pain Timing/Severity: constant  Quality of pain: dull, aching  Severity:  3 / 10   Modifying Factors: Pain worsens with walking  Associated Signs/Symptoms: drainage and pain    Ulcer Identification:  Ulcer Type: diabetic, non-healing/non-surgical, and neuropathic  Contributing Factors: diabetes    Diabetic/Pressure/Non Pressure Ulcers only:  Ulcer: Diabetic ulcer, fat layer exposed    If patient has diabetic lower extremity wounds  Moscoso Classification of diabetic lower extremity wounds:    Grade Description   []  0 No open wound   []  1 Superficial ulcer involving the full skin thickness   []  2 Deep ulcer involves ligament, tendon, joint capsule, or fascia  No bone involvement or abscess presence   []  3 Deep Ulcer with abcess 
SUPPLIES:     Incision 12/11/17 Abdomen Anterior (Active)   Number of days: 2485       Wound 10/01/24 Toe (Comment  which one) Right #1 right great toe (Active)   Wound Image   10/01/24 0919   Dressing Status New dressing applied;Clean;Dry;Intact 10/01/24 1025   Wound Cleansed Cleansed with saline 10/01/24 1025   Dressing/Treatment Collagen with Ag;Dry dressing 10/01/24 1025   Offloading for Diabetic Foot Ulcers Offloading ordered 10/01/24 1025   Wound Length (cm) 5.8 cm 10/01/24 0919   Wound Width (cm) 1.4 cm 10/01/24 0919   Wound Depth (cm) 0.1 cm 10/01/24 0919   Wound Surface Area (cm^2) 8.12 cm^2 10/01/24 0919   Wound Volume (cm^3) 0.812 cm^3 10/01/24 0919   Post-Procedure Length (cm) 5.8 cm 10/01/24 1005   Post-Procedure Width (cm) 1.4 cm 10/01/24 1005   Post-Procedure Depth (cm) 0.2 cm 10/01/24 1005   Post-Procedure Surface Area (cm^2) 8.12 cm^2 10/01/24 1005   Post-Procedure Volume (cm^3) 1.624 cm^3 10/01/24 1005   Wound Assessment Pink/red 10/01/24 0919   Drainage Amount Scant (moist but unmeasurable) 10/01/24 0919   Drainage Description Serosanguinous 10/01/24 0919   Odor None 10/01/24 0919   Haylee-wound Assessment Dry/flaky;Hyperkeratosis (callous) 10/01/24 0919   Margins Attached edges 10/01/24 0919   Wound Thickness Description not for Pressure Injury Full thickness 10/01/24 0919   Number of days: 0          Supplies Requested :      WOUND #: 1   PRIMARY DRESSING:  Other: Cathleen *GERMÁN   Cover and Secure with: ABD pad  Bulky roll gauze     FREQUENCY OF DRESSING CHANGES:  Daily       ADDITIONAL ITEMS:  [] Gloves Small  [x] Gloves Medium [] Gloves Large [] Gloves XLarge  [] Tape 1\" [x] Tape 2\" [] Tape 3\"  [] Medipore Tape  [x] Saline  [] Vashe  [] Skin Prep   [] Adhesive Remover   [] Cotton Tip Applicators   [x] Other: non-sterile 4x4 guaze for cleansing    Patient Wound(s) Debrided: [x] Yes if yes please add date 10/1/24   [] No    Debribement Type: Excisional/Sharp    Is the patient currently on an

## 2024-10-02 ENCOUNTER — HOSPITAL ENCOUNTER (OUTPATIENT)
Age: 54
Discharge: HOME | End: 2024-10-02
Payer: COMMERCIAL

## 2024-10-02 DIAGNOSIS — L97.512 DIABETIC ULCER OF TOE OF RIGHT FOOT ASSOCIATED WITH TYPE 2 DIABETES MELLITUS, WITH FAT LAYER EXPOSED (HCC): ICD-10-CM

## 2024-10-02 DIAGNOSIS — B35.3 TINEA PEDIS OF BOTH FEET: ICD-10-CM

## 2024-10-02 DIAGNOSIS — E11.621 DIABETIC ULCER OF TOE OF RIGHT FOOT ASSOCIATED WITH TYPE 2 DIABETES MELLITUS, WITH FAT LAYER EXPOSED (HCC): ICD-10-CM

## 2024-10-02 DIAGNOSIS — R09.89 DECREASED DORSALIS PEDIS PULSE: ICD-10-CM

## 2024-10-02 LAB
ALBUMIN SERPL-MCNC: 3.7 G/DL (ref 3.5–5.2)
ALP SERPL-CCNC: 119 U/L (ref 35–104)
ALT SERPL-CCNC: 10 U/L (ref 0–32)
ANION GAP SERPL CALCULATED.3IONS-SCNC: 11 MMOL/L (ref 7–16)
AST SERPL-CCNC: 15 U/L (ref 0–31)
BILIRUB SERPL-MCNC: 0.4 MG/DL (ref 0–1.2)
BUN SERPL-MCNC: 12 MG/DL (ref 6–20)
CALCIUM SERPL-MCNC: 9.2 MG/DL (ref 8.6–10.2)
CHLORIDE SERPL-SCNC: 102 MMOL/L (ref 98–107)
CO2 SERPL-SCNC: 30 MMOL/L (ref 22–29)
CREAT SERPL-MCNC: 0.7 MG/DL (ref 0.5–1)
ERYTHROCYTE [DISTWIDTH] IN BLOOD BY AUTOMATED COUNT: 13.4 % (ref 11.5–15)
GFR, ESTIMATED: >90 ML/MIN/1.73M2
GLUCOSE SERPL-MCNC: 121 MG/DL (ref 74–99)
HCT VFR BLD AUTO: 43.1 % (ref 34–48)
HGB BLD-MCNC: 13.5 G/DL (ref 11.5–15.5)
MCH RBC QN AUTO: 29.4 PG (ref 26–35)
MCHC RBC AUTO-ENTMCNC: 31.3 G/DL (ref 32–34.5)
MCV RBC AUTO: 93.9 FL (ref 80–99.9)
PLATELET # BLD AUTO: 309 K/UL (ref 130–450)
PMV BLD AUTO: 11.4 FL (ref 7–12)
POTASSIUM SERPL-SCNC: 3.2 MMOL/L (ref 3.5–5)
PROT SERPL-MCNC: 6.5 G/DL (ref 6.4–8.3)
RBC # BLD AUTO: 4.59 M/UL (ref 3.5–5.5)
SODIUM SERPL-SCNC: 143 MMOL/L (ref 132–146)
WBC OTHER # BLD: 8.1 K/UL (ref 4.5–11.5)

## 2024-10-02 PROCEDURE — 85027 COMPLETE CBC AUTOMATED: CPT

## 2024-10-02 PROCEDURE — 80053 COMPREHEN METABOLIC PANEL: CPT

## 2024-10-02 PROCEDURE — 36415 COLL VENOUS BLD VENIPUNCTURE: CPT

## 2024-10-07 NOTE — PROGRESS NOTES
Patient's lab work was reviewed, potassium 3.2, prescription sent to the pharmacy, 25 mEq, twice daily for 7 days  Will request the wound care, to call the patient to her The prescription, also follow-up on potassium level with her PCP next week

## 2024-10-07 NOTE — PROGRESS NOTES
Called Deandra and informed her that Dr. Bartholomew ordered a potassium suppliment to be taken and that it was sent in to her pharmacy. She was instructed to pick it up today and take 2x/day for 7 days. She was also asked to follow up with Dr. Stack for follow up blood work. Deandra verbalized understanding.

## 2024-10-10 NOTE — DISCHARGE INSTRUCTIONS
Visit Discharge/Physician Orders     Discharge condition: Stable     Assessment of pain at discharge: yes     Anesthetic used: lido 4%     Discharge to: Home     Left via:Private automobile     Accompanied by: self     ECF/HHA: carmine     Dressing Orders: To right great toe and plantar foot wound- Cleanse with saline, apply zara, ABD pad and kerlix. Change daily.     Treatment Orders: Eat a diet high in protein and vitamin C. Take a multiple vitamin daily unless contraindicated.    Arterial and vascular studies ordered, to be scheduled- please complete as soon as possible  Miconazole Ointment prescription sent to pharmacy (may obtain on amazon, less expensive)- apply to feet, toes, and legs- avoiding wounds twice a day for one month   Lamisil tablets prescription sent to pharmacy- take by mouth as prescribed.      [facility] followup visit _________1 week_______________  (Please note your next appointment above and if you are unable to keep, kindly give a 24 hour notice. Thank you.)     Physician signature:__________________________        If you experience any of the following, please call the Wound Care Center during business hours:     * Increase in Pain  * Temperature over 101  * Increase in drainage from your wound  * Drainage with a foul odor  * Bleeding  * Increase in swelling  * Need for compression bandage changes due to slippage, breakthrough drainage.     If you need medical attention outside of the business hours of the Wound Care Centers please contact your PCP or go to the nearest emergency room.

## 2024-10-15 ENCOUNTER — HOSPITAL ENCOUNTER (OUTPATIENT)
Dept: WOUND CARE | Age: 54
Discharge: HOME | End: 2024-10-15
Attending: STUDENT IN AN ORGANIZED HEALTH CARE EDUCATION/TRAINING PROGRAM
Payer: COMMERCIAL

## 2024-10-15 VITALS
WEIGHT: 204 LBS | TEMPERATURE: 96.6 F | RESPIRATION RATE: 18 BRPM | HEART RATE: 107 BPM | HEIGHT: 64 IN | SYSTOLIC BLOOD PRESSURE: 100 MMHG | DIASTOLIC BLOOD PRESSURE: 72 MMHG | BODY MASS INDEX: 34.83 KG/M2

## 2024-10-15 DIAGNOSIS — E11.621 DIABETIC ULCER OF TOE OF RIGHT FOOT ASSOCIATED WITH TYPE 2 DIABETES MELLITUS, WITH FAT LAYER EXPOSED (HCC): Primary | ICD-10-CM

## 2024-10-15 DIAGNOSIS — L97.512 DIABETIC ULCER OF TOE OF RIGHT FOOT ASSOCIATED WITH TYPE 2 DIABETES MELLITUS, WITH FAT LAYER EXPOSED (HCC): Primary | ICD-10-CM

## 2024-10-15 PROCEDURE — 11042 DBRDMT SUBQ TIS 1ST 20SQCM/<: CPT | Performed by: STUDENT IN AN ORGANIZED HEALTH CARE EDUCATION/TRAINING PROGRAM

## 2024-10-15 PROCEDURE — 11042 DBRDMT SUBQ TIS 1ST 20SQCM/<: CPT

## 2024-10-15 ASSESSMENT — PAIN DESCRIPTION - PAIN TYPE: TYPE: ACUTE PAIN

## 2024-10-15 ASSESSMENT — PAIN DESCRIPTION - ORIENTATION: ORIENTATION: RIGHT

## 2024-10-15 ASSESSMENT — PAIN - FUNCTIONAL ASSESSMENT: PAIN_FUNCTIONAL_ASSESSMENT: ACTIVITIES ARE NOT PREVENTED

## 2024-10-15 ASSESSMENT — PAIN DESCRIPTION - DESCRIPTORS: DESCRIPTORS: ACHING

## 2024-10-15 ASSESSMENT — PAIN SCALES - GENERAL: PAINLEVEL_OUTOF10: 7

## 2024-10-15 ASSESSMENT — PAIN DESCRIPTION - LOCATION: LOCATION: FOOT

## 2024-10-15 NOTE — PROGRESS NOTES
Wound Healing Center /Hyperbarics   History and Physical/Consultation  Vascular    Referring Physician : Felice Stack Jr.,   Mari Rainey  MEDICAL RECORD NUMBER:  [id number]  AGE: 54 y.o.   GENDER: female  : 1970  EPISODE DATE:  10/15/2024  Subjective:     Chief Complaint   Patient presents with    Wound Check     R foot         HISTORY of PRESENT ILLNESS HPI     Mari Rainey is a 54 y.o. female who presents today for wound/ulcer evaluation.   History of Wound Context:  The patient has had a wound of right foot mainly involving right great toe which was first noted approximately 6 months.  This has been treated by her pediatric surgeon, Dr. Appiah who referred the patient to wound care center for further evaluation.  On their initial visit to the wound healing center, 10/15/24, the patient has noted that the wound has not been improving.  The patient has not had similar previous wounds in the past.      Patient tells me, that she works in flip-flops, without any socks, denies any history of trauma no history of fever or chills    Patient does have history of diabetes mellitus diabetic neuropathy    Patient does not smoke    Pt is currently  on abx.  Doxycycline      Wound/Ulcer Pain Timing/Severity: constant  Quality of pain: dull, aching  Severity:  3 / 10   Modifying Factors: Pain worsens with walking  Associated Signs/Symptoms: drainage and pain    Ulcer Identification:  Ulcer Type: diabetic, non-healing/non-surgical, and neuropathic  Contributing Factors: diabetes    Diabetic/Pressure/Non Pressure Ulcers only:  Ulcer: Diabetic ulcer, fat layer exposed    If patient has diabetic lower extremity wounds  Moscoso Classification of diabetic lower extremity wounds:    Grade Description   []  0 No open wound   []  1 Superficial ulcer involving the full skin thickness   []  2 Deep ulcer involves ligament, tendon, joint capsule, or fascia  No bone involvement or abscess presence   []  3 Deep Ulcer

## 2024-10-15 NOTE — PLAN OF CARE
Problem: Pain  Goal: Verbalizes/displays adequate comfort level or baseline comfort level  Outcome: Progressing     Problem: Pain  Goal: Verbalizes/displays adequate comfort level or baseline comfort level  Outcome: Progressing     Problem: Cognitive:  Goal: Knowledge of wound care  Description: Knowledge of wound care  Outcome: Progressing  Goal: Understands risk factors for wounds  Description: Understands risk factors for wounds  Outcome: Progressing     Problem: Wound:  Goal: Will show signs of wound healing; wound closure and no evidence of infection  Description: Will show signs of wound healing; wound closure and no evidence of infection  Outcome: Progressing     Problem: Blood Glucose:  Goal: Ability to maintain appropriate glucose levels will improve  Description: Ability to maintain appropriate glucose levels will improve  Outcome: Progressing

## 2024-10-15 NOTE — PROGRESS NOTES
Patient came in to the Wound Care Clinic with pulse of 195 and blood pressure of 98/71. Patient is advised to go to ER.  She states \" it feels like panic attack\".      0915:pulse 107 and /72 Patient states \"she is feeling much better\".  Patient believes it was a panic attack, she had them before but states this one was longer.

## 2024-10-15 NOTE — PROGRESS NOTES
Patient was reassessed at discharge and her pulse was 111. She was told by nursing to monitor at home and go to the ED if symptoms persist.

## 2024-10-16 NOTE — DISCHARGE INSTRUCTIONS
Visit Discharge/Physician Orders     Discharge condition: Stable     Assessment of pain at discharge: yes     Anesthetic used: lido 4%     Discharge to: Home     Left via:Private automobile     Accompanied by: self     ECF/HHA: carmine     Dressing Orders: To right great toe and plantar foot wound- Cleanse with saline, apply zara, ABD pad and kerlix. Change daily.     Treatment Orders: Eat a diet high in protein and vitamin C. Take a multiple vitamin daily unless contraindicated.     Arterial and vascular studies scheduled 10/30  Miconazole Ointment prescription sent to pharmacy (may obtain on amazon, less expensive)- apply to feet, toes, and legs- avoiding wounds twice a day for one month   Lamisil tablets prescription sent to pharmacy- take by mouth as prescribed.   Wear orthotic shoes once obtained     [facility] followup visit _________1 week_______________  (Please note your next appointment above and if you are unable to keep, kindly give a 24 hour notice. Thank you.)     Physician signature:__________________________        If you experience any of the following, please call the Wound Care Center during business hours:     * Increase in Pain  * Temperature over 101  * Increase in drainage from your wound  * Drainage with a foul odor  * Bleeding  * Increase in swelling  * Need for compression bandage changes due to slippage, breakthrough drainage.     If you need medical attention outside of the business hours of the Wound Care Centers please contact your PCP or go to the nearest emergency room.

## 2024-10-22 ENCOUNTER — HOSPITAL ENCOUNTER (OUTPATIENT)
Dept: WOUND CARE | Age: 54
Discharge: HOME | End: 2024-10-22
Attending: STUDENT IN AN ORGANIZED HEALTH CARE EDUCATION/TRAINING PROGRAM
Payer: COMMERCIAL

## 2024-10-22 VITALS
HEIGHT: 64 IN | TEMPERATURE: 97 F | WEIGHT: 204 LBS | SYSTOLIC BLOOD PRESSURE: 125 MMHG | DIASTOLIC BLOOD PRESSURE: 87 MMHG | BODY MASS INDEX: 34.83 KG/M2 | HEART RATE: 91 BPM

## 2024-10-22 DIAGNOSIS — E11.621 DIABETIC ULCER OF TOE OF RIGHT FOOT ASSOCIATED WITH TYPE 2 DIABETES MELLITUS, WITH FAT LAYER EXPOSED (HCC): Primary | ICD-10-CM

## 2024-10-22 DIAGNOSIS — L97.512 DIABETIC ULCER OF TOE OF RIGHT FOOT ASSOCIATED WITH TYPE 2 DIABETES MELLITUS, WITH FAT LAYER EXPOSED (HCC): Primary | ICD-10-CM

## 2024-10-22 PROCEDURE — 11042 DBRDMT SUBQ TIS 1ST 20SQCM/<: CPT | Performed by: SURGERY

## 2024-10-22 PROCEDURE — 11042 DBRDMT SUBQ TIS 1ST 20SQCM/<: CPT

## 2024-10-22 RX ORDER — NEOMYCIN/BACITRACIN/POLYMYXINB 3.5-400-5K
OINTMENT (GRAM) TOPICAL ONCE
OUTPATIENT
Start: 2024-10-22 | End: 2024-10-22

## 2024-10-22 RX ORDER — SODIUM CHLOR/HYPOCHLOROUS ACID 0.033 %
SOLUTION, IRRIGATION IRRIGATION ONCE
OUTPATIENT
Start: 2024-10-22 | End: 2024-10-22

## 2024-10-22 RX ORDER — BACITRACIN ZINC AND POLYMYXIN B SULFATE 500; 1000 [USP'U]/G; [USP'U]/G
OINTMENT TOPICAL ONCE
OUTPATIENT
Start: 2024-10-22 | End: 2024-10-22

## 2024-10-22 RX ORDER — LIDOCAINE 40 MG/G
CREAM TOPICAL ONCE
OUTPATIENT
Start: 2024-10-22 | End: 2024-10-22

## 2024-10-22 RX ORDER — BACITRACIN ZINC 500 [USP'U]/G
OINTMENT TOPICAL ONCE
OUTPATIENT
Start: 2024-10-22 | End: 2024-10-22

## 2024-10-22 RX ORDER — CLOBETASOL PROPIONATE 0.5 MG/G
OINTMENT TOPICAL ONCE
OUTPATIENT
Start: 2024-10-22 | End: 2024-10-22

## 2024-10-22 RX ORDER — LIDOCAINE HYDROCHLORIDE 20 MG/ML
JELLY TOPICAL ONCE
OUTPATIENT
Start: 2024-10-22 | End: 2024-10-22

## 2024-10-22 RX ORDER — MUPIROCIN 20 MG/G
OINTMENT TOPICAL ONCE
OUTPATIENT
Start: 2024-10-22 | End: 2024-10-22

## 2024-10-22 RX ORDER — SILVER SULFADIAZINE 10 MG/G
CREAM TOPICAL ONCE
OUTPATIENT
Start: 2024-10-22 | End: 2024-10-22

## 2024-10-22 RX ORDER — LIDOCAINE 50 MG/G
OINTMENT TOPICAL ONCE
OUTPATIENT
Start: 2024-10-22 | End: 2024-10-22

## 2024-10-22 RX ORDER — TRIAMCINOLONE ACETONIDE 1 MG/G
OINTMENT TOPICAL ONCE
OUTPATIENT
Start: 2024-10-22 | End: 2024-10-22

## 2024-10-22 RX ORDER — GENTAMICIN SULFATE 1 MG/G
OINTMENT TOPICAL ONCE
OUTPATIENT
Start: 2024-10-22 | End: 2024-10-22

## 2024-10-22 RX ORDER — LIDOCAINE HYDROCHLORIDE 40 MG/ML
SOLUTION TOPICAL ONCE
OUTPATIENT
Start: 2024-10-22 | End: 2024-10-22

## 2024-10-22 RX ORDER — LIDOCAINE HYDROCHLORIDE 40 MG/ML
SOLUTION TOPICAL ONCE
Status: COMPLETED | OUTPATIENT
Start: 2024-10-22 | End: 2024-10-22

## 2024-10-22 RX ORDER — BETAMETHASONE DIPROPIONATE 0.5 MG/G
CREAM TOPICAL ONCE
OUTPATIENT
Start: 2024-10-22 | End: 2024-10-22

## 2024-10-22 RX ADMIN — LIDOCAINE HYDROCHLORIDE 10 ML: 40 SOLUTION TOPICAL at 09:10

## 2024-10-22 ASSESSMENT — PAIN - FUNCTIONAL ASSESSMENT: PAIN_FUNCTIONAL_ASSESSMENT: PREVENTS OR INTERFERES SOME ACTIVE ACTIVITIES AND ADLS

## 2024-10-22 ASSESSMENT — PAIN DESCRIPTION - DESCRIPTORS: DESCRIPTORS: BURNING;DISCOMFORT

## 2024-10-22 ASSESSMENT — PAIN SCALES - GENERAL: PAINLEVEL_OUTOF10: 10

## 2024-10-22 ASSESSMENT — PAIN DESCRIPTION - PAIN TYPE: TYPE: ACUTE PAIN

## 2024-10-22 ASSESSMENT — PAIN DESCRIPTION - ORIENTATION: ORIENTATION: RIGHT

## 2024-10-22 ASSESSMENT — PAIN DESCRIPTION - LOCATION: LOCATION: FOOT

## 2024-10-22 NOTE — PLAN OF CARE
Problem: Wound:  Goal: Will show signs of wound healing; wound closure and no evidence of infection  Description: Will show signs of wound healing; wound closure and no evidence of infection  Outcome: Progressing     Problem: Blood Glucose:  Goal: Ability to maintain appropriate glucose levels will improve  Description: Ability to maintain appropriate glucose levels will improve  Outcome: Progressing     Problem: Cognitive:  Goal: Knowledge of wound care  Description: Knowledge of wound care  Outcome: Adequate for Discharge  Goal: Understands risk factors for wounds  Description: Understands risk factors for wounds  Outcome: Adequate for Discharge

## 2024-10-22 NOTE — PROGRESS NOTES
Wound Image   10/01/24 0919   Dressing Status New dressing applied 10/15/24 0952   Wound Cleansed Cleansed with saline 10/15/24 0952   Dressing/Treatment Collagen with Ag;Dry dressing 10/15/24 0952   Offloading for Diabetic Foot Ulcers Offloading ordered 10/15/24 0952   Wound Length (cm) 3.5 cm 10/22/24 0856   Wound Width (cm) 3.6 cm 10/22/24 0856   Wound Depth (cm) 0.1 cm 10/22/24 0856   Wound Surface Area (cm^2) 12.6 cm^2 10/22/24 0856   Change in Wound Size % (l*w) -55.17 10/22/24 0856   Wound Volume (cm^3) 1.26 cm^3 10/22/24 0856   Wound Healing % -55 10/22/24 0856   Post-Procedure Length (cm) 3.6 cm 10/22/24 0921   Post-Procedure Width (cm) 3.6 cm 10/22/24 0921   Post-Procedure Depth (cm) 0.1 cm 10/22/24 0921   Post-Procedure Surface Area (cm^2) 12.96 cm^2 10/22/24 0921   Post-Procedure Volume (cm^3) 1.296 cm^3 10/22/24 0921   Wound Assessment Pale granulation tissue;Pink/red 10/22/24 0856   Drainage Amount Scant (moist but unmeasurable) 10/22/24 0856   Drainage Description Serosanguinous 10/22/24 0856   Odor None 10/22/24 0856   Haylee-wound Assessment Hyperkeratosis (callous) 10/22/24 0856   Margins Attached edges 10/01/24 0919   Wound Thickness Description not for Pressure Injury Full thickness 10/01/24 0919   Number of days: 21       Wound 10/15/24 Foot Right;Plantar #2 (Active)   Wound Image   10/15/24 0854   Dressing Status New dressing applied 10/15/24 0952   Wound Cleansed Cleansed with saline 10/15/24 0952   Dressing/Treatment Collagen with Ag;Dry dressing 10/15/24 0952   Offloading for Diabetic Foot Ulcers Offloading ordered 10/15/24 0952   Wound Length (cm) 1.4 cm 10/22/24 0856   Wound Width (cm) 0.3 cm 10/22/24 0856   Wound Depth (cm) 0.1 cm 10/22/24 0856   Wound Surface Area (cm^2) 0.42 cm^2 10/22/24 0856   Change in Wound Size % (l*w) 40 10/22/24 0856   Wound Volume (cm^3) 0.042 cm^3 10/22/24 0856   Wound Healing % 40 10/22/24 0856   Post-Procedure Length (cm) 1.4 cm 10/22/24 0921   Post-Procedure

## 2024-10-23 NOTE — DISCHARGE INSTRUCTIONS
Visit Discharge/Physician Orders     Discharge condition: Stable     Assessment of pain at discharge: yes     Anesthetic used: lido 4%     Discharge to: Home     Left via:Private automobile     Accompanied by: self     ECF/HHA: [facility]     Dressing Orders:   To right great toe wound- Cleanse with saline, apply zara, ABD pad and kerlix. Change daily.  To left plantar foot wound- cleanse with normal saline solution, apply zara and cover with dry dressing. Change daily    Plantar foot wound healed     Treatment Orders: Eat a diet high in protein and vitamin C. Take a multiple vitamin daily unless contraindicated.    Doxycyline ordered to pharmacy    Arterial and vascular studies scheduled 10/30  Miconazole Ointment prescription sent to pharmacy (may obtain on amazon, less expensive)- apply to feet, toes, and legs- avoiding wounds twice a day for one month   Lamisil tablets prescription sent to pharmacy- take by mouth as prescribed.   Wear orthotic shoes once obtained     [facility] followup visit _________1 week_______________  (Please note your next appointment above and if you are unable to keep, kindly give a 24 hour notice. Thank you.)     Physician signature:__________________________        If you experience any of the following, please call the Wound Care Center during business hours:     * Increase in Pain  * Temperature over 101  * Increase in drainage from your wound  * Drainage with a foul odor  * Bleeding  * Increase in swelling  * Need for compression bandage changes due to slippage, breakthrough drainage.     If you need medical attention outside of the business hours of the Wound Care Centers please contact your PCP or go to the nearest emergency room.

## 2024-10-29 ENCOUNTER — HOSPITAL ENCOUNTER (OUTPATIENT)
Dept: WOUND CARE | Age: 54
Discharge: HOME | End: 2024-10-29
Attending: STUDENT IN AN ORGANIZED HEALTH CARE EDUCATION/TRAINING PROGRAM
Payer: COMMERCIAL

## 2024-10-29 VITALS
SYSTOLIC BLOOD PRESSURE: 125 MMHG | HEART RATE: 78 BPM | RESPIRATION RATE: 18 BRPM | TEMPERATURE: 96.6 F | WEIGHT: 204 LBS | BODY MASS INDEX: 34.83 KG/M2 | DIASTOLIC BLOOD PRESSURE: 71 MMHG | HEIGHT: 64 IN

## 2024-10-29 DIAGNOSIS — E11.621 DIABETIC ULCER OF TOE OF RIGHT FOOT ASSOCIATED WITH TYPE 2 DIABETES MELLITUS, WITH FAT LAYER EXPOSED (HCC): Primary | ICD-10-CM

## 2024-10-29 DIAGNOSIS — L97.512 DIABETIC ULCER OF TOE OF RIGHT FOOT ASSOCIATED WITH TYPE 2 DIABETES MELLITUS, WITH FAT LAYER EXPOSED (HCC): Primary | ICD-10-CM

## 2024-10-29 PROCEDURE — 11042 DBRDMT SUBQ TIS 1ST 20SQCM/<: CPT | Performed by: STUDENT IN AN ORGANIZED HEALTH CARE EDUCATION/TRAINING PROGRAM

## 2024-10-29 PROCEDURE — 11042 DBRDMT SUBQ TIS 1ST 20SQCM/<: CPT

## 2024-10-29 RX ORDER — LIDOCAINE 40 MG/G
CREAM TOPICAL ONCE
OUTPATIENT
Start: 2024-10-29 | End: 2024-10-29

## 2024-10-29 RX ORDER — GENTAMICIN SULFATE 1 MG/G
OINTMENT TOPICAL ONCE
OUTPATIENT
Start: 2024-10-29 | End: 2024-10-29

## 2024-10-29 RX ORDER — TRIAMCINOLONE ACETONIDE 1 MG/G
OINTMENT TOPICAL ONCE
OUTPATIENT
Start: 2024-10-29 | End: 2024-10-29

## 2024-10-29 RX ORDER — BACITRACIN ZINC 500 [USP'U]/G
OINTMENT TOPICAL ONCE
OUTPATIENT
Start: 2024-10-29 | End: 2024-10-29

## 2024-10-29 RX ORDER — LIDOCAINE 50 MG/G
OINTMENT TOPICAL ONCE
OUTPATIENT
Start: 2024-10-29 | End: 2024-10-29

## 2024-10-29 RX ORDER — DOXYCYCLINE HYCLATE 100 MG
100 TABLET ORAL 2 TIMES DAILY
Qty: 14 TABLET | Refills: 0 | Status: SHIPPED | OUTPATIENT
Start: 2024-10-29 | End: 2024-11-05

## 2024-10-29 RX ORDER — LIDOCAINE HYDROCHLORIDE 40 MG/ML
SOLUTION TOPICAL ONCE
Status: COMPLETED | OUTPATIENT
Start: 2024-10-29 | End: 2024-10-29

## 2024-10-29 RX ORDER — MUPIROCIN 20 MG/G
OINTMENT TOPICAL ONCE
OUTPATIENT
Start: 2024-10-29 | End: 2024-10-29

## 2024-10-29 RX ORDER — CLOBETASOL PROPIONATE 0.5 MG/G
OINTMENT TOPICAL ONCE
OUTPATIENT
Start: 2024-10-29 | End: 2024-10-29

## 2024-10-29 RX ORDER — BACITRACIN ZINC AND POLYMYXIN B SULFATE 500; 1000 [USP'U]/G; [USP'U]/G
OINTMENT TOPICAL ONCE
OUTPATIENT
Start: 2024-10-29 | End: 2024-10-29

## 2024-10-29 RX ORDER — BETAMETHASONE DIPROPIONATE 0.5 MG/G
CREAM TOPICAL ONCE
OUTPATIENT
Start: 2024-10-29 | End: 2024-10-29

## 2024-10-29 RX ORDER — LIDOCAINE HYDROCHLORIDE 20 MG/ML
JELLY TOPICAL ONCE
OUTPATIENT
Start: 2024-10-29 | End: 2024-10-29

## 2024-10-29 RX ORDER — NEOMYCIN/BACITRACIN/POLYMYXINB 3.5-400-5K
OINTMENT (GRAM) TOPICAL ONCE
OUTPATIENT
Start: 2024-10-29 | End: 2024-10-29

## 2024-10-29 RX ORDER — SILVER SULFADIAZINE 10 MG/G
CREAM TOPICAL ONCE
OUTPATIENT
Start: 2024-10-29 | End: 2024-10-29

## 2024-10-29 RX ORDER — SODIUM CHLOR/HYPOCHLOROUS ACID 0.033 %
SOLUTION, IRRIGATION IRRIGATION ONCE
OUTPATIENT
Start: 2024-10-29 | End: 2024-10-29

## 2024-10-29 RX ORDER — LIDOCAINE HYDROCHLORIDE 40 MG/ML
SOLUTION TOPICAL ONCE
OUTPATIENT
Start: 2024-10-29 | End: 2024-10-29

## 2024-10-29 RX ADMIN — LIDOCAINE HYDROCHLORIDE: 40 SOLUTION TOPICAL at 08:49

## 2024-10-29 ASSESSMENT — PAIN - FUNCTIONAL ASSESSMENT: PAIN_FUNCTIONAL_ASSESSMENT: PREVENTS OR INTERFERES SOME ACTIVE ACTIVITIES AND ADLS

## 2024-10-29 ASSESSMENT — PAIN DESCRIPTION - DESCRIPTORS: DESCRIPTORS: BURNING

## 2024-10-29 ASSESSMENT — PAIN DESCRIPTION - LOCATION: LOCATION: TOE (COMMENT WHICH ONE)

## 2024-10-29 ASSESSMENT — PAIN SCALES - GENERAL: PAINLEVEL_OUTOF10: 10

## 2024-10-29 ASSESSMENT — PAIN DESCRIPTION - ORIENTATION: ORIENTATION: RIGHT

## 2024-10-29 NOTE — PROGRESS NOTES
Documentation Flow Sheet    Wound/Ulcer #: 1, 2    Post Debridement Measurements:  Wound/Ulcer Descriptions are Pre Debridement except measurements:    Incision 12/11/17 Abdomen Anterior (Active)   Number of days: 2513       Wound 10/01/24 Toe (Comment  which one) Right #1 right great toe (Active)   Wound Image    10/29/24 0849   Dressing Status New dressing applied;Clean;Dry;Intact 10/22/24 0935   Wound Cleansed Cleansed with saline 10/22/24 0935   Dressing/Treatment Collagen with Ag;Dry dressing;ABD 10/22/24 0935   Offloading for Diabetic Foot Ulcers Offloading ordered 10/15/24 0952   Wound Length (cm) 5.5 cm 10/29/24 0849   Wound Width (cm) 7.1 cm 10/29/24 0849   Wound Depth (cm) 0.1 cm 10/29/24 0849   Wound Surface Area (cm^2) 39.05 cm^2 10/29/24 0849   Change in Wound Size % (l*w) -380.91 10/29/24 0849   Wound Volume (cm^3) 3.905 cm^3 10/29/24 0849   Wound Healing % -381 10/29/24 0849   Post-Procedure Length (cm) 3.6 cm 10/22/24 0921   Post-Procedure Width (cm) 3.6 cm 10/22/24 0921   Post-Procedure Depth (cm) 0.1 cm 10/22/24 0921   Post-Procedure Surface Area (cm^2) 12.96 cm^2 10/22/24 0921   Post-Procedure Volume (cm^3) 1.296 cm^3 10/22/24 0921   Wound Assessment Fibrin;Pink/red;Pale granulation tissue 10/29/24 0849   Drainage Amount Moderate (25-50%) 10/29/24 0849   Drainage Description Serosanguinous;Yellow 10/29/24 0849   Odor None 10/29/24 0849   Haylee-wound Assessment Dry/flaky;Blanchable erythema 10/29/24 0849   Margins Attached edges 10/01/24 0919   Wound Thickness Description not for Pressure Injury Full thickness 10/01/24 0919   Number of days: 28       Wound 10/15/24 Foot Right;Plantar #2 (Active)   Wound Image   10/29/24 0849   Dressing Status New dressing applied;Clean;Dry;Intact 10/22/24 0935   Wound Cleansed Cleansed with saline 10/22/24 0935   Dressing/Treatment Collagen with Ag;Dry dressing;ABD 10/22/24 0935   Offloading for Diabetic Foot Ulcers Offloading ordered 10/22/24 0935   Wound Length

## 2024-10-30 ENCOUNTER — HOSPITAL ENCOUNTER (OUTPATIENT)
Dept: INTERVENTIONAL RADIOLOGY/VASCULAR | Age: 54
Discharge: HOME | End: 2024-11-01
Attending: SURGERY
Payer: OTHER GOVERNMENT

## 2024-10-30 DIAGNOSIS — R09.89 DECREASED DORSALIS PEDIS PULSE: ICD-10-CM

## 2024-10-30 DIAGNOSIS — L97.512 DIABETIC ULCER OF TOE OF RIGHT FOOT ASSOCIATED WITH TYPE 2 DIABETES MELLITUS, WITH FAT LAYER EXPOSED (HCC): ICD-10-CM

## 2024-10-30 DIAGNOSIS — B35.3 TINEA PEDIS OF BOTH FEET: ICD-10-CM

## 2024-10-30 DIAGNOSIS — E11.621 DIABETIC ULCER OF TOE OF RIGHT FOOT ASSOCIATED WITH TYPE 2 DIABETES MELLITUS, WITH FAT LAYER EXPOSED (HCC): ICD-10-CM

## 2024-10-30 PROCEDURE — 93923 UPR/LXTR ART STDY 3+ LVLS: CPT

## 2024-10-30 NOTE — DISCHARGE INSTRUCTIONS
Visit Discharge/Physician Orders     Discharge condition: Stable     Assessment of pain at discharge: yes     Anesthetic used: lido 4%     Discharge to: Home     Left via:Private automobile     Accompanied by: self     ECF/HHA: carmine     Dressing Orders:   To right great toe wound- Cleanse with saline, apply zara, ABD pad and kerlix. Change daily.  To left plantar foot wound- cleanse with normal saline solution, apply zara and cover with dry dressing. Change daily     Treatment Orders: Eat a diet high in protein and vitamin C. Take a multiple vitamin daily unless contraindicated.     Arterial and vascular studies - reviewed  Miconazole Ointment prescription sent to pharmacy (may obtain on amazon, less expensive)- apply to feet, toes, and legs- avoiding wounds twice a day for one month   Lamisil tablets prescription sent to pharmacy- take by mouth as prescribed.   Wear orthotic shoes once obtained     [facility] followup visit _________1 week_______________  (Please note your next appointment above and if you are unable to keep, kindly give a 24 hour notice. Thank you.)     Physician signature:__________________________        If you experience any of the following, please call the Wound Care Center during business hours:     * Increase in Pain  * Temperature over 101  * Increase in drainage from your wound  * Drainage with a foul odor  * Bleeding  * Increase in swelling  * Need for compression bandage changes due to slippage, breakthrough drainage.     If you need medical attention outside of the business hours of the Wound Care Centers please contact your PCP or go to the nearest emergency room.

## 2024-11-01 ENCOUNTER — TELEPHONE (OUTPATIENT)
Dept: VASCULAR SURGERY | Age: 54
End: 2024-11-01

## 2024-11-01 NOTE — TELEPHONE ENCOUNTER
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 Rhoades when she sees him on Tuesday  at the wound care center

## 2024-11-05 ENCOUNTER — HOSPITAL ENCOUNTER (OUTPATIENT)
Dept: WOUND CARE | Age: 54
Discharge: HOME | End: 2024-11-05
Attending: STUDENT IN AN ORGANIZED HEALTH CARE EDUCATION/TRAINING PROGRAM
Payer: OTHER GOVERNMENT

## 2024-11-05 VITALS
DIASTOLIC BLOOD PRESSURE: 83 MMHG | TEMPERATURE: 97 F | SYSTOLIC BLOOD PRESSURE: 120 MMHG | RESPIRATION RATE: 19 BRPM | HEART RATE: 85 BPM

## 2024-11-05 DIAGNOSIS — E11.621 DIABETIC ULCER OF TOE OF RIGHT FOOT ASSOCIATED WITH TYPE 2 DIABETES MELLITUS, WITH FAT LAYER EXPOSED (HCC): Primary | ICD-10-CM

## 2024-11-05 DIAGNOSIS — L97.512 DIABETIC ULCER OF TOE OF RIGHT FOOT ASSOCIATED WITH TYPE 2 DIABETES MELLITUS, WITH FAT LAYER EXPOSED (HCC): Primary | ICD-10-CM

## 2024-11-05 PROCEDURE — 11042 DBRDMT SUBQ TIS 1ST 20SQCM/<: CPT | Performed by: STUDENT IN AN ORGANIZED HEALTH CARE EDUCATION/TRAINING PROGRAM

## 2024-11-05 PROCEDURE — 11042 DBRDMT SUBQ TIS 1ST 20SQCM/<: CPT

## 2024-11-05 RX ORDER — LIDOCAINE HYDROCHLORIDE 40 MG/ML
SOLUTION TOPICAL ONCE
Status: COMPLETED | OUTPATIENT
Start: 2024-11-05 | End: 2024-11-05

## 2024-11-05 RX ORDER — LIDOCAINE HYDROCHLORIDE 40 MG/ML
SOLUTION TOPICAL ONCE
OUTPATIENT
Start: 2024-11-05 | End: 2024-11-05

## 2024-11-05 RX ORDER — GENTAMICIN SULFATE 1 MG/G
OINTMENT TOPICAL ONCE
OUTPATIENT
Start: 2024-11-05 | End: 2024-11-05

## 2024-11-05 RX ORDER — LIDOCAINE HYDROCHLORIDE 20 MG/ML
JELLY TOPICAL ONCE
OUTPATIENT
Start: 2024-11-05 | End: 2024-11-05

## 2024-11-05 RX ORDER — BACITRACIN ZINC AND POLYMYXIN B SULFATE 500; 1000 [USP'U]/G; [USP'U]/G
OINTMENT TOPICAL ONCE
OUTPATIENT
Start: 2024-11-05 | End: 2024-11-05

## 2024-11-05 RX ORDER — TRIAMCINOLONE ACETONIDE 1 MG/G
OINTMENT TOPICAL ONCE
OUTPATIENT
Start: 2024-11-05 | End: 2024-11-05

## 2024-11-05 RX ORDER — SODIUM CHLOR/HYPOCHLOROUS ACID 0.033 %
SOLUTION, IRRIGATION IRRIGATION ONCE
OUTPATIENT
Start: 2024-11-05 | End: 2024-11-05

## 2024-11-05 RX ORDER — LIDOCAINE 40 MG/G
CREAM TOPICAL ONCE
OUTPATIENT
Start: 2024-11-05 | End: 2024-11-05

## 2024-11-05 RX ORDER — BACITRACIN ZINC 500 [USP'U]/G
OINTMENT TOPICAL ONCE
OUTPATIENT
Start: 2024-11-05 | End: 2024-11-05

## 2024-11-05 RX ORDER — CLOBETASOL PROPIONATE 0.5 MG/G
OINTMENT TOPICAL ONCE
OUTPATIENT
Start: 2024-11-05 | End: 2024-11-05

## 2024-11-05 RX ORDER — LIDOCAINE 50 MG/G
OINTMENT TOPICAL ONCE
OUTPATIENT
Start: 2024-11-05 | End: 2024-11-05

## 2024-11-05 RX ORDER — BETAMETHASONE DIPROPIONATE 0.5 MG/G
CREAM TOPICAL ONCE
OUTPATIENT
Start: 2024-11-05 | End: 2024-11-05

## 2024-11-05 RX ORDER — MUPIROCIN 20 MG/G
OINTMENT TOPICAL ONCE
OUTPATIENT
Start: 2024-11-05 | End: 2024-11-05

## 2024-11-05 RX ORDER — NEOMYCIN/BACITRACIN/POLYMYXINB 3.5-400-5K
OINTMENT (GRAM) TOPICAL ONCE
OUTPATIENT
Start: 2024-11-05 | End: 2024-11-05

## 2024-11-05 RX ORDER — SILVER SULFADIAZINE 10 MG/G
CREAM TOPICAL ONCE
OUTPATIENT
Start: 2024-11-05 | End: 2024-11-05

## 2024-11-05 RX ADMIN — LIDOCAINE HYDROCHLORIDE 7 ML: 40 SOLUTION TOPICAL at 08:54

## 2024-11-05 ASSESSMENT — PAIN DESCRIPTION - LOCATION: LOCATION: TOE (COMMENT WHICH ONE)

## 2024-11-05 ASSESSMENT — PAIN SCALES - GENERAL: PAINLEVEL_OUTOF10: 10

## 2024-11-05 ASSESSMENT — PAIN DESCRIPTION - ORIENTATION: ORIENTATION: RIGHT

## 2024-11-05 ASSESSMENT — PAIN DESCRIPTION - DESCRIPTORS: DESCRIPTORS: BURNING;DISCOMFORT

## 2024-11-06 NOTE — DISCHARGE INSTRUCTIONS
Visit Discharge/Physician Orders     Discharge condition: Stable     Assessment of pain at discharge: yes     Anesthetic used: lido 4%     Discharge to: Home     Left via:Private automobile     Accompanied by: self     ECF/HHA: carmine     Dressing Orders:   To right great toe wound- Cleanse with saline, apply zara, ABD pad and kerlix. Change daily.  To left plantar foot wound- cleanse with normal saline solution, apply zara and cover with dry dressing. Change daily     Treatment Orders: Eat a diet high in protein and vitamin C. Take a multiple vitamin daily unless contraindicated.     Arterial and vascular studies - reviewed  Miconazole Ointment prescription sent to pharmacy (may obtain on amazon, less expensive)- apply to feet, toes, and legs- avoiding wounds twice a day for one month   Lamisil tablets prescription sent to pharmacy- take by mouth as prescribed.   Wear orthotic shoes once obtained     [facility] followup visit _________1 week_______________  (Please note your next appointment above and if you are unable to keep, kindly give a 24 hour notice. Thank you.)     Physician signature:__________________________        If you experience any of the following, please call the Wound Care Center during business hours:     * Increase in Pain  * Temperature over 101  * Increase in drainage from your wound  * Drainage with a foul odor  * Bleeding  * Increase in swelling  * Need for compression bandage changes due to slippage, breakthrough drainage.     If you need medical attention outside of the business hours of the Wound Care Centers please contact your PCP or go to the nearest emergency room.

## 2024-11-12 ENCOUNTER — HOSPITAL ENCOUNTER (OUTPATIENT)
Dept: WOUND CARE | Age: 54
Discharge: HOME | End: 2024-11-12
Attending: STUDENT IN AN ORGANIZED HEALTH CARE EDUCATION/TRAINING PROGRAM

## 2024-11-19 NOTE — DISCHARGE INSTRUCTIONS
Written       Visit Discharge/Physician Orders     Discharge condition: Stable     Assessment of pain at discharge: yes     Anesthetic used: lido 4%     Discharge to: Home     Left via:Private automobile     Accompanied by: self     ECF/HHA: carmine     Dressing Orders:   To right great toe wound- Cleanse with saline, apply zara, ABD pad and kerlix. Change daily.  To left plantar foot wound- cleanse with normal saline solution, apply zara and cover with dry dressing. Change daily     Treatment Orders: Eat a diet high in protein and vitamin C. Take a multiple vitamin daily unless contraindicated.     Arterial and vascular studies - reviewed  Miconazole Ointment prescription sent to pharmacy (may obtain on amazon, less expensive)- apply to feet, toes, and legs- avoiding wounds twice a day for one month   Lamisil tablets prescription sent to pharmacy- take by mouth as prescribed.   Wear orthotic shoes once obtained     [facility] followup visit _________1 week_______________  (Please note your next appointment above and if you are unable to keep, kindly give a 24 hour notice. Thank you.)     Physician signature:__________________________        If you experience any of the following, please call the Wound Care Center during business hours:     * Increase in Pain  * Temperature over 101  * Increase in drainage from your wound  * Drainage with a foul odor  * Bleeding  * Increase in swelling  * Need for compression bandage changes due to slippage, breakthrough drainage.     If you need medical attention outside of the business hours of the Wound Care Centers please contact your PCP or go to the nearest emergency room.

## 2024-11-21 ENCOUNTER — HOSPITAL ENCOUNTER (OUTPATIENT)
Dept: WOUND CARE | Age: 54
Discharge: HOME | End: 2024-11-21
Attending: STUDENT IN AN ORGANIZED HEALTH CARE EDUCATION/TRAINING PROGRAM

## 2024-11-25 ENCOUNTER — HOSPITAL ENCOUNTER (OUTPATIENT)
Age: 54
Discharge: HOME | End: 2024-11-25
Payer: OTHER GOVERNMENT

## 2024-11-25 DIAGNOSIS — L97.512 DIABETIC ULCER OF TOE OF RIGHT FOOT ASSOCIATED WITH TYPE 2 DIABETES MELLITUS, WITH FAT LAYER EXPOSED (HCC): ICD-10-CM

## 2024-11-25 DIAGNOSIS — E11.621 DIABETIC ULCER OF TOE OF RIGHT FOOT ASSOCIATED WITH TYPE 2 DIABETES MELLITUS, WITH FAT LAYER EXPOSED (HCC): ICD-10-CM

## 2024-11-25 LAB
HBV SURFACE AG SERPL QL IA: NONREACTIVE
HCV AB SERPL QL IA: NONREACTIVE

## 2024-11-25 PROCEDURE — 36415 COLL VENOUS BLD VENIPUNCTURE: CPT

## 2024-11-25 PROCEDURE — 86803 HEPATITIS C AB TEST: CPT

## 2024-11-25 PROCEDURE — 87340 HEPATITIS B SURFACE AG IA: CPT

## 2024-11-26 ENCOUNTER — HOSPITAL ENCOUNTER (OUTPATIENT)
Dept: WOUND CARE | Age: 54
Discharge: HOME | End: 2024-11-26
Attending: PODIATRIST
Payer: OTHER GOVERNMENT

## 2024-11-26 VITALS
RESPIRATION RATE: 18 BRPM | DIASTOLIC BLOOD PRESSURE: 82 MMHG | WEIGHT: 200 LBS | HEART RATE: 87 BPM | HEIGHT: 64 IN | TEMPERATURE: 97.1 F | SYSTOLIC BLOOD PRESSURE: 123 MMHG | BODY MASS INDEX: 34.15 KG/M2

## 2024-11-26 DIAGNOSIS — L97.512 DIABETIC ULCER OF TOE OF RIGHT FOOT ASSOCIATED WITH TYPE 2 DIABETES MELLITUS, WITH FAT LAYER EXPOSED (HCC): Primary | ICD-10-CM

## 2024-11-26 DIAGNOSIS — E11.621 DIABETIC ULCER OF TOE OF RIGHT FOOT ASSOCIATED WITH TYPE 2 DIABETES MELLITUS, WITH FAT LAYER EXPOSED (HCC): Primary | ICD-10-CM

## 2024-11-26 PROCEDURE — 11042 DBRDMT SUBQ TIS 1ST 20SQCM/<: CPT

## 2024-11-26 RX ORDER — BACITRACIN ZINC AND POLYMYXIN B SULFATE 500; 1000 [USP'U]/G; [USP'U]/G
OINTMENT TOPICAL ONCE
OUTPATIENT
Start: 2024-11-26 | End: 2024-11-26

## 2024-11-26 RX ORDER — LIDOCAINE HYDROCHLORIDE 40 MG/ML
SOLUTION TOPICAL ONCE
OUTPATIENT
Start: 2024-11-26 | End: 2024-11-26

## 2024-11-26 RX ORDER — BETAMETHASONE DIPROPIONATE 0.5 MG/G
CREAM TOPICAL ONCE
OUTPATIENT
Start: 2024-11-26 | End: 2024-11-26

## 2024-11-26 RX ORDER — GENTAMICIN SULFATE 1 MG/G
OINTMENT TOPICAL ONCE
OUTPATIENT
Start: 2024-11-26 | End: 2024-11-26

## 2024-11-26 RX ORDER — TRIAMCINOLONE ACETONIDE 1 MG/G
OINTMENT TOPICAL ONCE
OUTPATIENT
Start: 2024-11-26 | End: 2024-11-26

## 2024-11-26 RX ORDER — MUPIROCIN 20 MG/G
OINTMENT TOPICAL ONCE
OUTPATIENT
Start: 2024-11-26 | End: 2024-11-26

## 2024-11-26 RX ORDER — CLOBETASOL PROPIONATE 0.5 MG/G
OINTMENT TOPICAL ONCE
OUTPATIENT
Start: 2024-11-26 | End: 2024-11-26

## 2024-11-26 RX ORDER — SULFAMETHOXAZOLE AND TRIMETHOPRIM 800; 160 MG/1; MG/1
1 TABLET ORAL 2 TIMES DAILY
COMMUNITY

## 2024-11-26 RX ORDER — LIDOCAINE HYDROCHLORIDE 20 MG/ML
JELLY TOPICAL ONCE
OUTPATIENT
Start: 2024-11-26 | End: 2024-11-26

## 2024-11-26 RX ORDER — NEOMYCIN/BACITRACIN/POLYMYXINB 3.5-400-5K
OINTMENT (GRAM) TOPICAL ONCE
OUTPATIENT
Start: 2024-11-26 | End: 2024-11-26

## 2024-11-26 RX ORDER — GINSENG 100 MG
CAPSULE ORAL ONCE
OUTPATIENT
Start: 2024-11-26 | End: 2024-11-26

## 2024-11-26 RX ORDER — LIDOCAINE 50 MG/G
OINTMENT TOPICAL ONCE
OUTPATIENT
Start: 2024-11-26 | End: 2024-11-26

## 2024-11-26 RX ORDER — LIDOCAINE HYDROCHLORIDE 40 MG/ML
SOLUTION TOPICAL ONCE
Status: COMPLETED | OUTPATIENT
Start: 2024-11-26 | End: 2024-11-26

## 2024-11-26 RX ORDER — SILVER SULFADIAZINE 10 MG/G
CREAM TOPICAL ONCE
OUTPATIENT
Start: 2024-11-26 | End: 2024-11-26

## 2024-11-26 RX ORDER — SODIUM CHLOR/HYPOCHLOROUS ACID 0.033 %
SOLUTION, IRRIGATION IRRIGATION ONCE
OUTPATIENT
Start: 2024-11-26 | End: 2024-11-26

## 2024-11-26 RX ORDER — LIDOCAINE 40 MG/G
CREAM TOPICAL ONCE
OUTPATIENT
Start: 2024-11-26 | End: 2024-11-26

## 2024-11-26 RX ADMIN — LIDOCAINE HYDROCHLORIDE 10 ML: 40 SOLUTION TOPICAL at 08:46

## 2024-11-26 NOTE — DISCHARGE INSTRUCTIONS
Visit Discharge/Physician Orders     Discharge condition: Stable     Assessment of pain at discharge: yes     Anesthetic used: lido 4%     Discharge to: Home     Left via:Private automobile     Accompanied by: self     ECF/HHA: carmine     Dressing Orders:  Right great toe wound- Cleanse with saline, apply zara, ABD pad and kerlix. Change daily.       Treatment Orders: Eat a diet high in protein and vitamin C. Take a multiple vitamin daily unless contraindicated.  Wear surgical shoe while ambulating. Avoid getting foot wet.        [facility] followup visit _________1 week_______________  (Please note your next appointment above and if you are unable to keep, kindly give a 24 hour notice. Thank you.)     Physician signature:__________________________        If you experience any of the following, please call the Wound Care Center during business hours:     * Increase in Pain  * Temperature over 101  * Increase in drainage from your wound  * Drainage with a foul odor  * Bleeding  * Increase in swelling  * Need for compression bandage changes due to slippage, breakthrough drainage.     If you need medical attention outside of the business hours of the Wound Care Centers please contact your PCP or go to the nearest emergency room.

## 2024-11-26 NOTE — DISCHARGE INSTRUCTIONS
Visit Discharge/Physician Orders     Discharge condition: Stable     Assessment of pain at discharge: yes     Anesthetic used: lido 4%     Discharge to: Home     Left via:Private automobile     Accompanied by: self     ECF/HHA: carmine     Dressing Orders:  Right great toe wound- Cleanse with saline, apply zara, ABD pad and kerlix. Change daily.        Treatment Orders: Eat a diet high in protein and vitamin C. Take a multiple vitamin daily unless contraindicated.  Wear surgical shoe while ambulating. Avoid getting foot wet.        [facility] followup visit _________1 week_______________  (Please note your next appointment above and if you are unable to keep, kindly give a 24 hour notice. Thank you.)     Physician signature:__________________________        If you experience any of the following, please call the Wound Care Center during business hours:     * Increase in Pain  * Temperature over 101  * Increase in drainage from your wound  * Drainage with a foul odor  * Bleeding  * Increase in swelling  * Need for compression bandage changes due to slippage, breakthrough drainage.     If you need medical attention outside of the business hours of the Wound Care Centers please contact your PCP or go to the nearest emergency room.

## 2024-11-26 NOTE — PROGRESS NOTES
Wound Healing Center  History and Physical/Consultation  Podiatry    Referring Physician : Felice Stack Jr.,   Mari Rainey  MEDICAL RECORD NUMBER:  [id number]  AGE: 54 y.o.   GENDER: female  : 1970  EPISODE DATE:  2024  Subjective:     Chief Complaint   Patient presents with    Wound Check     Bilateral feet         HISTORY of PRESENT ILLNESS HPI     Mari Rainey is a 54 y.o. female who presents today for wound/ulcer evaluation.   History of Wound Context:  The patient has had a wound of Left great toe which was first noted approximately 2024.      24: Patient seen today for f/u left great toe ulceration. Patient was seen at my office last week, placed on PO abx d/t cellulitis of the great toe, which has resolved. Continue abx until completed. Patient did admit to chronic \"picking\" of skin on wound and fingers. Discussed with patient negative impact on wound healing. She is aware and making effort to stop. Patient also placed in offloading surgical shoe. Will continue daily zara. Patient also complaining of dry, thick skin to feet will rx ammonum lactate daily. Patient to continue wearing offloading shoe. Discussed TCC with patient, which she politely declined at this time asking to wait a few weeks to see if improvement. Patient to f/u 1 week    Pt is on abx at time of initial visit.      Wound/Ulcer Pain Timing/Severity: none  Quality of pain: N/A  Severity:  0 / 10   Modifying Factors: None  Associated Signs/Symptoms: none    Ulcer Identification:  Ulcer Type: diabetic  Contributing Factors: diabetes and chronic pressure    Diabetic/Pressure/Non Pressure Ulcers onl y:  Ulcer: Diabetic ulcer, fat layer exposed    If patient has diabetic lower extremity wounds  Moscoso Classification of diabetic lower extremity wounds:    Grade Description   []  0 No open wound   [x]  1 Superficial ulcer involving the full skin thickness   []  2 Deep ulcer involves ligament, tendon, joint

## 2024-12-03 ENCOUNTER — HOSPITAL ENCOUNTER (OUTPATIENT)
Dept: WOUND CARE | Age: 54
Discharge: HOME | End: 2024-12-03
Attending: PODIATRIST

## 2024-12-10 ENCOUNTER — HOSPITAL ENCOUNTER (OUTPATIENT)
Dept: WOUND CARE | Age: 54
Discharge: HOME | End: 2024-12-10
Attending: PODIATRIST
Payer: OTHER GOVERNMENT

## 2024-12-10 VITALS
HEART RATE: 92 BPM | SYSTOLIC BLOOD PRESSURE: 133 MMHG | RESPIRATION RATE: 18 BRPM | TEMPERATURE: 96.8 F | DIASTOLIC BLOOD PRESSURE: 90 MMHG

## 2024-12-10 DIAGNOSIS — E11.621 DIABETIC ULCER OF TOE OF RIGHT FOOT ASSOCIATED WITH TYPE 2 DIABETES MELLITUS, WITH FAT LAYER EXPOSED (HCC): Primary | ICD-10-CM

## 2024-12-10 DIAGNOSIS — L97.512 DIABETIC ULCER OF TOE OF RIGHT FOOT ASSOCIATED WITH TYPE 2 DIABETES MELLITUS, WITH FAT LAYER EXPOSED (HCC): Primary | ICD-10-CM

## 2024-12-10 PROCEDURE — 11042 DBRDMT SUBQ TIS 1ST 20SQCM/<: CPT

## 2024-12-10 RX ORDER — TRIAMCINOLONE ACETONIDE 1 MG/G
OINTMENT TOPICAL ONCE
OUTPATIENT
Start: 2024-12-10 | End: 2024-12-10

## 2024-12-10 RX ORDER — LIDOCAINE HYDROCHLORIDE 40 MG/ML
SOLUTION TOPICAL ONCE
OUTPATIENT
Start: 2024-12-10 | End: 2024-12-10

## 2024-12-10 RX ORDER — SILVER SULFADIAZINE 10 MG/G
CREAM TOPICAL ONCE
OUTPATIENT
Start: 2024-12-10 | End: 2024-12-10

## 2024-12-10 RX ORDER — BACITRACIN ZINC AND POLYMYXIN B SULFATE 500; 1000 [USP'U]/G; [USP'U]/G
OINTMENT TOPICAL ONCE
OUTPATIENT
Start: 2024-12-10 | End: 2024-12-10

## 2024-12-10 RX ORDER — LIDOCAINE HYDROCHLORIDE 20 MG/ML
JELLY TOPICAL ONCE
OUTPATIENT
Start: 2024-12-10 | End: 2024-12-10

## 2024-12-10 RX ORDER — CLOBETASOL PROPIONATE 0.5 MG/G
OINTMENT TOPICAL ONCE
OUTPATIENT
Start: 2024-12-10 | End: 2024-12-10

## 2024-12-10 RX ORDER — LIDOCAINE 50 MG/G
OINTMENT TOPICAL ONCE
OUTPATIENT
Start: 2024-12-10 | End: 2024-12-10

## 2024-12-10 RX ORDER — LIDOCAINE HYDROCHLORIDE 40 MG/ML
SOLUTION TOPICAL ONCE
Status: COMPLETED | OUTPATIENT
Start: 2024-12-10 | End: 2024-12-10

## 2024-12-10 RX ORDER — SODIUM CHLOR/HYPOCHLOROUS ACID 0.033 %
SOLUTION, IRRIGATION IRRIGATION ONCE
OUTPATIENT
Start: 2024-12-10 | End: 2024-12-10

## 2024-12-10 RX ORDER — GINSENG 100 MG
CAPSULE ORAL ONCE
OUTPATIENT
Start: 2024-12-10 | End: 2024-12-10

## 2024-12-10 RX ORDER — BETAMETHASONE DIPROPIONATE 0.5 MG/G
CREAM TOPICAL ONCE
OUTPATIENT
Start: 2024-12-10 | End: 2024-12-10

## 2024-12-10 RX ORDER — GENTAMICIN SULFATE 1 MG/G
OINTMENT TOPICAL ONCE
OUTPATIENT
Start: 2024-12-10 | End: 2024-12-10

## 2024-12-10 RX ORDER — LIDOCAINE 40 MG/G
CREAM TOPICAL ONCE
OUTPATIENT
Start: 2024-12-10 | End: 2024-12-10

## 2024-12-10 RX ORDER — MUPIROCIN 20 MG/G
OINTMENT TOPICAL ONCE
OUTPATIENT
Start: 2024-12-10 | End: 2024-12-10

## 2024-12-10 RX ORDER — NEOMYCIN/BACITRACIN/POLYMYXINB 3.5-400-5K
OINTMENT (GRAM) TOPICAL ONCE
OUTPATIENT
Start: 2024-12-10 | End: 2024-12-10

## 2024-12-10 RX ADMIN — LIDOCAINE HYDROCHLORIDE: 40 SOLUTION TOPICAL at 08:22

## 2024-12-10 ASSESSMENT — PAIN DESCRIPTION - DESCRIPTORS: DESCRIPTORS: BURNING;DISCOMFORT

## 2024-12-10 ASSESSMENT — PAIN DESCRIPTION - LOCATION: LOCATION: TOE (COMMENT WHICH ONE)

## 2024-12-10 ASSESSMENT — PAIN DESCRIPTION - ORIENTATION: ORIENTATION: RIGHT

## 2024-12-10 ASSESSMENT — PAIN SCALES - GENERAL: PAINLEVEL_OUTOF10: 7

## 2024-12-10 NOTE — DISCHARGE INSTRUCTIONS
Visit Discharge/Physician Orders     Discharge condition: Stable     Assessment of pain at discharge: yes     Anesthetic used: lido 4%     Discharge to: Home     Left via:Private automobile     Accompanied by: self     ECF/HHA: carmine     Dressing Orders:  Right great toe wound- Cleanse with saline, apply zara, ABD pad and kerlix. Change daily.        Treatment Orders: Eat a diet high in protein and vitamin C. Take a multiple vitamin daily unless contraindicated.  Wear surgical shoe while ambulating. Avoid getting foot wet.        [facility] followup visit _________1 week_______________  (Please note your next appointment above and if you are unable to keep, kindly give a 24 hour notice. Thank you.)     Physician signature:__________________________        If you experience any of the following, please call the Wound Care Center during business hours:     * Increase in Pain  * Temperature over 101  * Increase in drainage from your wound  * Drainage with a foul odor  * Bleeding  * Increase in swelling  * Need for compression bandage changes due to slippage, breakthrough drainage.     If you need medical attention outside of the business hours of the Wound Care Centers please contact your PCP or go to the nearest emergency room.

## 2024-12-17 ENCOUNTER — HOSPITAL ENCOUNTER (OUTPATIENT)
Dept: WOUND CARE | Age: 54
Discharge: HOME | End: 2024-12-17
Attending: PODIATRIST

## 2024-12-20 ENCOUNTER — HOSPITAL ENCOUNTER (EMERGENCY)
Age: 54
Discharge: HOME | End: 2024-12-20
Payer: OTHER GOVERNMENT

## 2024-12-20 VITALS
SYSTOLIC BLOOD PRESSURE: 138 MMHG | DIASTOLIC BLOOD PRESSURE: 93 MMHG | TEMPERATURE: 98.6 F | RESPIRATION RATE: 18 BRPM | BODY MASS INDEX: 35.02 KG/M2 | OXYGEN SATURATION: 98 % | HEART RATE: 73 BPM | WEIGHT: 204 LBS

## 2024-12-20 DIAGNOSIS — Z51.89 VISIT FOR WOUND CHECK: Primary | ICD-10-CM

## 2024-12-20 PROCEDURE — 99211 OFF/OP EST MAY X REQ PHY/QHP: CPT

## 2024-12-20 RX ORDER — SULFAMETHOXAZOLE AND TRIMETHOPRIM 800; 160 MG/1; MG/1
1 TABLET ORAL 2 TIMES DAILY
Qty: 20 TABLET | Refills: 0 | Status: SHIPPED | OUTPATIENT
Start: 2024-12-20 | End: 2024-12-30

## 2024-12-20 ASSESSMENT — PAIN DESCRIPTION - LOCATION: LOCATION: TOE (COMMENT WHICH ONE)

## 2024-12-20 ASSESSMENT — PAIN - FUNCTIONAL ASSESSMENT: PAIN_FUNCTIONAL_ASSESSMENT: 0-10

## 2024-12-20 ASSESSMENT — PAIN DESCRIPTION - ORIENTATION: ORIENTATION: RIGHT

## 2024-12-20 ASSESSMENT — PAIN SCALES - GENERAL: PAINLEVEL_OUTOF10: 8

## 2024-12-20 NOTE — ED PROVIDER NOTES
[facility] URGENT CARE  EMERGENCY DEPARTMENT ENCOUNTER        NAME: Mari Rainey  :  1970  MRN:  [id number]  Date of evaluation: 2024  Provider: Arron Weeks PA-C  PCP: Felice Stack Jr., DO  Note Started : 2:21 PM EST 24    Chief Complaint: Toe Pain ( Right toe big toe    wound  has had it for 4 months say a red streak warred about infection     is a diabetic   )      This is a 54-year-old female who presents to urgent care complaining of a reddened area to the plantar surface of her right foot.  She states she noticed a red streak and some swelling to her right foot today.  He is diabetic she states and she does states she sees a foot doctor.  Denies any fevers or chills.  On first contact patient she appears to be in no acute distress        Review of Systems  Pertinent positives and negatives are stated within HPI, all other systems reviewed and are negative.     Allergies: Patient has no known allergies.     --------------------------------------------- PAST HISTORY ---------------------------------------------  Past Medical History:  has a past medical history of Decreased dorsalis pedis pulse, Diabetes mellitus (HCC), Diabetic ulcer of toe of right foot associated with type 2 diabetes mellitus, with fat layer exposed (HCC), Hyperlipidemia, Kidney stone, Syncopal episodes, Tinea cruris, and Tinea pedis of both feet.    Past Surgical History:  has a past surgical history that includes Cholecystectomy; Appendectomy; Tubal ligation (); other surgical history (2017); and Hysterectomy.    Social History:  reports that she has never smoked. She has never used smokeless tobacco. She reports that she does not currently use alcohol. She reports that she does not use drugs.    Family History: family history includes Bipolar Disorder in her brother; Heart Disease in her mother; Other in her brother and mother; Schizophrenia in her brother.     The patient’s home  on this before told to monitor very closely follow-up with her foot doctor if worse go to ER.  Instructions given.         DISCHARGE MEDICATIONS:  New Prescriptions    SULFAMETHOXAZOLE-TRIMETHOPRIM (BACTRIM DS) 800-160 MG PER TABLET    Take 1 tablet by mouth 2 times daily for 10 days       DISCONTINUED MEDICATIONS:  Discontinued Medications    No medications on file       PATIENT REFERRED TO:  Yayo Rankin, DPM  [address]  [address]  [facility] [address]  [phone]            --------------------------------- ADDITIONAL PROVIDER NOTES ---------------------------------  I have spoken with the patient and discussed today’s results, in addition to providing specific details for the plan of care and counseling regarding the diagnosis and prognosis.  Their questions are answered at this time and they are agreeable with the plan.   This patient is stable for discharge.  I have shared the specific conditions for return, as well as the importance of follow-up.      * NOTE: (Please note that portions of this note were completed with a voice recognition program.  Efforts were made to edit the dictations but occasionally words are mis-transcribed.)    --------------------------------- IMPRESSION AND DISPOSITION ---------------------------------    IMPRESSION  1. Visit for wound check        Disposition: Discharge to home  Patient condition is good    I am the Primary Clinician of Record.     Arron Weeks PA-C (electronically signed) on 12/20/2024 at 3:11 PM         Arron Weeks PA-C  12/20/24 [id number]

## 2024-12-31 ENCOUNTER — HOSPITAL ENCOUNTER (OUTPATIENT)
Dept: WOUND CARE | Age: 54
Discharge: HOME | End: 2024-12-31
Attending: PODIATRIST

## 2025-01-06 NOTE — DISCHARGE INSTRUCTIONS
Visit Discharge/Physician Orders     Discharge condition: Stable     Assessment of pain at discharge: yes     Anesthetic used: lido 4%     Discharge to: Home     Left via:Private automobile     Accompanied by: self     ECF/HHA: carmine     Dressing Orders:  Right great toe wound- Cleanse with saline, apply zara, ABD pad and kerlix. Change daily.        Treatment Orders: Eat a diet high in protein and vitamin C. Take a multiple vitamin daily unless contraindicated.  Wear surgical shoe while ambulating. Avoid getting foot wet.        [facility] followup visit _________1 week_______________  (Please note your next appointment above and if you are unable to keep, kindly give a 24 hour notice. Thank you.)     Physician signature:__________________________        If you experience any of the following, please call the Wound Care Center during business hours:     * Increase in Pain  * Temperature over 101  * Increase in drainage from your wound  * Drainage with a foul odor  * Bleeding  * Increase in swelling  * Need for compression bandage changes due to slippage, breakthrough drainage.     If you need medical attention outside of the business hours of the Wound Care Centers please contact your PCP or go to the nearest emergency room.

## 2025-01-07 ENCOUNTER — HOSPITAL ENCOUNTER (OUTPATIENT)
Dept: WOUND CARE | Age: 55
Discharge: HOME | End: 2025-01-07
Attending: PODIATRIST

## 2025-05-22 RX ORDER — SODIUM CHLORIDE 9 MG/ML
INJECTION, SOLUTION INTRAVENOUS CONTINUOUS
OUTPATIENT
Start: 2025-05-22

## 2025-05-27 ENCOUNTER — HOSPITAL ENCOUNTER (OUTPATIENT)
Dept: PREADMISSION TESTING | Age: 55
Discharge: HOME | End: 2025-05-27
Payer: COMMERCIAL

## 2025-05-27 VITALS
HEIGHT: 64 IN | RESPIRATION RATE: 18 BRPM | WEIGHT: 223 LBS | DIASTOLIC BLOOD PRESSURE: 73 MMHG | SYSTOLIC BLOOD PRESSURE: 106 MMHG | BODY MASS INDEX: 38.07 KG/M2 | TEMPERATURE: 97.8 F | HEART RATE: 81 BPM

## 2025-05-27 DIAGNOSIS — Z01.818 PRE-OP TESTING: Primary | ICD-10-CM

## 2025-05-27 LAB
ANION GAP SERPL CALCULATED.3IONS-SCNC: 10 MMOL/L (ref 7–16)
BASOPHILS # BLD: 0.04 K/UL (ref 0–0.2)
BASOPHILS NFR BLD: 0 % (ref 0–2)
BUN SERPL-MCNC: 15 MG/DL (ref 6–20)
CALCIUM SERPL-MCNC: 9.3 MG/DL (ref 8.6–10.2)
CHLORIDE SERPL-SCNC: 103 MMOL/L (ref 98–107)
CO2 SERPL-SCNC: 26 MMOL/L (ref 22–29)
CREAT SERPL-MCNC: 0.7 MG/DL (ref 0.5–1)
EOSINOPHIL # BLD: 0.05 K/UL (ref 0.05–0.5)
EOSINOPHILS RELATIVE PERCENT: 1 % (ref 0–6)
ERYTHROCYTE [DISTWIDTH] IN BLOOD BY AUTOMATED COUNT: 13.3 % (ref 11.5–15)
GFR, ESTIMATED: >90 ML/MIN/1.73M2
GLUCOSE SERPL-MCNC: 161 MG/DL (ref 74–99)
HCT VFR BLD AUTO: 43.1 % (ref 34–48)
HGB BLD-MCNC: 13.7 G/DL (ref 11.5–15.5)
IMM GRANULOCYTES # BLD AUTO: 0.04 K/UL (ref 0–0.58)
IMM GRANULOCYTES NFR BLD: 0 % (ref 0–5)
LYMPHOCYTES NFR BLD: 2.57 K/UL (ref 1.5–4)
LYMPHOCYTES RELATIVE PERCENT: 26 % (ref 20–42)
MCH RBC QN AUTO: 30.3 PG (ref 26–35)
MCHC RBC AUTO-ENTMCNC: 31.8 G/DL (ref 32–34.5)
MCV RBC AUTO: 95.4 FL (ref 80–99.9)
MONOCYTES NFR BLD: 0.47 K/UL (ref 0.1–0.95)
MONOCYTES NFR BLD: 5 % (ref 2–12)
NEUTROPHILS NFR BLD: 68 % (ref 43–80)
NEUTS SEG NFR BLD: 6.6 K/UL (ref 1.8–7.3)
PLATELET # BLD AUTO: 343 K/UL (ref 130–450)
PMV BLD AUTO: 9.6 FL (ref 7–12)
POTASSIUM SERPL-SCNC: 4.2 MMOL/L (ref 3.5–5)
RBC # BLD AUTO: 4.52 M/UL (ref 3.5–5.5)
SODIUM SERPL-SCNC: 139 MMOL/L (ref 132–146)
WBC OTHER # BLD: 9.8 K/UL (ref 4.5–11.5)

## 2025-05-27 PROCEDURE — 80048 BASIC METABOLIC PNL TOTAL CA: CPT

## 2025-05-27 PROCEDURE — 85025 COMPLETE CBC W/AUTO DIFF WBC: CPT

## 2025-05-27 RX ORDER — BUSPIRONE HYDROCHLORIDE 5 MG/1
5 TABLET ORAL 3 TIMES DAILY PRN
COMMUNITY

## 2025-05-27 RX ORDER — PIOGLITAZONE 30 MG/1
30 TABLET ORAL DAILY
COMMUNITY

## 2025-05-27 ASSESSMENT — PAIN DESCRIPTION - ORIENTATION: ORIENTATION: RIGHT;LEFT

## 2025-05-27 ASSESSMENT — PAIN SCALES - GENERAL: PAINLEVEL_OUTOF10: 5

## 2025-05-27 ASSESSMENT — PAIN DESCRIPTION - DESCRIPTORS: DESCRIPTORS: ACHING;DISCOMFORT;CRAMPING

## 2025-05-27 ASSESSMENT — PAIN DESCRIPTION - LOCATION: LOCATION: TOE (COMMENT WHICH ONE)

## 2025-05-27 NOTE — PROGRESS NOTES
[facility]                                                                                                                    PRE OP INSTRUCTIONS FOR  Mari Rainey        Date: 5/27/2025    Date of surgery: 6-6-25   Arrival Time: Hospital will call you on 6-5-25 between 5pm and 7pm with your final arrival time for surgery. Go to     front of hospital and check in at information desk.    Nothing by mouth (NPO) as instructed. May have clear liquids up to 2 hours prior to surgery. Nothing solid after midnight. Examples: water, apple juice, black coffee, plain tea    Take the following medications with a small sip of water on the morning of Surgery: lexapro, buspar, gabapentin, omeprazole, may take Actos the day before surgery - no diabetic medication after midnight.                Stop Farxiga with last dose on 6-2-25 in morning, and stop Mounjaro with last dose on 5-25-25.       Diabetics may take half the evening dose of insulin but none after midnight.  If you feel symptomatic or have low blood sugar morning of surgery drink 1-2 ounces of apple juice only. If you take a weekly insulin injection ________Mounjaro_______, stop 7 days prior to surgery. If you take ____Farxiga___________, stop 3-4 days prior to surgery.    Aspirin, Ibuprofen, Advil, Naproxen, other Anti-inflammatory products should be stopped before surgery as directed by your surgeon, cardiologist, or primary care Doctor. Herbal supplements and Vitamin E should be stopped five days prior.  May take Tylenol unless instructed otherwise by your surgeon.    Check with your Doctor regarding stopping Plavix, Coumadin, Lovenox, Eliquis, Effient, or other blood thinners such as, pradaxa, lixiana, xaralto and savaysa.    Do not smoke, chew tobacco, vape, or use illicit drugs and do not drink any alcoholic beverages 24 hours prior to surgery.    You may brush your teeth the morning of surgery.      You MUST make arrangements for a

## 2025-06-06 ENCOUNTER — ANESTHESIA EVENT (OUTPATIENT)
Dept: OPERATING ROOM | Age: 55
End: 2025-06-06
Payer: COMMERCIAL

## 2025-06-06 ENCOUNTER — HOSPITAL ENCOUNTER (OUTPATIENT)
Age: 55
Setting detail: OUTPATIENT SURGERY
Discharge: HOME | End: 2025-06-06
Attending: PODIATRIST | Admitting: PODIATRIST
Payer: COMMERCIAL

## 2025-06-06 ENCOUNTER — ANESTHESIA (OUTPATIENT)
Dept: OPERATING ROOM | Age: 55
End: 2025-06-06
Payer: COMMERCIAL

## 2025-06-06 VITALS
OXYGEN SATURATION: 98 % | HEIGHT: 64 IN | TEMPERATURE: 98.1 F | SYSTOLIC BLOOD PRESSURE: 108 MMHG | RESPIRATION RATE: 16 BRPM | BODY MASS INDEX: 38.07 KG/M2 | WEIGHT: 223 LBS | DIASTOLIC BLOOD PRESSURE: 72 MMHG | HEART RATE: 63 BPM

## 2025-06-06 LAB — GLUCOSE BLD-MCNC: 101 MG/DL (ref 74–99)

## 2025-06-06 PROCEDURE — 3700000000 HC ANESTHESIA ATTENDED CARE: Performed by: PODIATRIST

## 2025-06-06 PROCEDURE — 2709999900 HC NON-CHARGEABLE SUPPLY: Performed by: PODIATRIST

## 2025-06-06 PROCEDURE — 2580000003 HC RX 258: Performed by: ANESTHESIOLOGY

## 2025-06-06 PROCEDURE — 7100000010 HC PHASE II RECOVERY - FIRST 15 MIN: Performed by: PODIATRIST

## 2025-06-06 PROCEDURE — 3600000013 HC SURGERY LEVEL 3 ADDTL 15MIN: Performed by: PODIATRIST

## 2025-06-06 PROCEDURE — 7100000011 HC PHASE II RECOVERY - ADDTL 15 MIN: Performed by: PODIATRIST

## 2025-06-06 PROCEDURE — 3700000001 HC ADD 15 MINUTES (ANESTHESIA): Performed by: PODIATRIST

## 2025-06-06 PROCEDURE — 6360000002 HC RX W HCPCS: Performed by: PODIATRIST

## 2025-06-06 PROCEDURE — 3600000003 HC SURGERY LEVEL 3 BASE: Performed by: PODIATRIST

## 2025-06-06 PROCEDURE — 6360000002 HC RX W HCPCS: Performed by: NURSE ANESTHETIST, CERTIFIED REGISTERED

## 2025-06-06 PROCEDURE — 76937 US GUIDE VASCULAR ACCESS: CPT

## 2025-06-06 PROCEDURE — 2500000003 HC RX 250 WO HCPCS: Performed by: NURSE ANESTHETIST, CERTIFIED REGISTERED

## 2025-06-06 PROCEDURE — 82962 GLUCOSE BLOOD TEST: CPT

## 2025-06-06 DEVICE — GRAFT SKIN GRAFIX PL PRIME 3X4CM LYPOPRESERVED: Type: IMPLANTABLE DEVICE | Site: FOOT | Status: FUNCTIONAL

## 2025-06-06 DEVICE — IMPLANTABLE DEVICE: Type: IMPLANTABLE DEVICE | Site: FOOT | Status: FUNCTIONAL

## 2025-06-06 RX ORDER — LABETALOL HYDROCHLORIDE 5 MG/ML
10 INJECTION, SOLUTION INTRAVENOUS
Status: DISCONTINUED | OUTPATIENT
Start: 2025-06-06 | End: 2025-06-06 | Stop reason: HOSPADM

## 2025-06-06 RX ORDER — NALOXONE HYDROCHLORIDE 0.4 MG/ML
INJECTION, SOLUTION INTRAMUSCULAR; INTRAVENOUS; SUBCUTANEOUS PRN
Status: DISCONTINUED | OUTPATIENT
Start: 2025-06-06 | End: 2025-06-06 | Stop reason: HOSPADM

## 2025-06-06 RX ORDER — PROPOFOL 10 MG/ML
INJECTION, EMULSION INTRAVENOUS
Status: DISCONTINUED | OUTPATIENT
Start: 2025-06-06 | End: 2025-06-06 | Stop reason: SDUPTHER

## 2025-06-06 RX ORDER — SODIUM CHLORIDE 0.9 % (FLUSH) 0.9 %
5-40 SYRINGE (ML) INJECTION PRN
Status: DISCONTINUED | OUTPATIENT
Start: 2025-06-06 | End: 2025-06-06 | Stop reason: HOSPADM

## 2025-06-06 RX ORDER — FENTANYL CITRATE 0.05 MG/ML
25 INJECTION, SOLUTION INTRAMUSCULAR; INTRAVENOUS EVERY 5 MIN PRN
Status: DISCONTINUED | OUTPATIENT
Start: 2025-06-06 | End: 2025-06-06 | Stop reason: HOSPADM

## 2025-06-06 RX ORDER — MIDAZOLAM HYDROCHLORIDE 1 MG/ML
INJECTION, SOLUTION INTRAMUSCULAR; INTRAVENOUS
Status: DISCONTINUED | OUTPATIENT
Start: 2025-06-06 | End: 2025-06-06 | Stop reason: SDUPTHER

## 2025-06-06 RX ORDER — KETOROLAC TROMETHAMINE 30 MG/ML
30 INJECTION, SOLUTION INTRAMUSCULAR; INTRAVENOUS ONCE
Status: DISCONTINUED | OUTPATIENT
Start: 2025-06-06 | End: 2025-06-06 | Stop reason: HOSPADM

## 2025-06-06 RX ORDER — FENTANYL CITRATE 50 UG/ML
INJECTION, SOLUTION INTRAMUSCULAR; INTRAVENOUS
Status: DISCONTINUED | OUTPATIENT
Start: 2025-06-06 | End: 2025-06-06 | Stop reason: SDUPTHER

## 2025-06-06 RX ORDER — SODIUM CHLORIDE 0.9 % (FLUSH) 0.9 %
5-40 SYRINGE (ML) INJECTION EVERY 12 HOURS SCHEDULED
Status: DISCONTINUED | OUTPATIENT
Start: 2025-06-06 | End: 2025-06-06 | Stop reason: HOSPADM

## 2025-06-06 RX ORDER — SODIUM CHLORIDE 9 MG/ML
INJECTION, SOLUTION INTRAVENOUS CONTINUOUS
Status: DISCONTINUED | OUTPATIENT
Start: 2025-06-06 | End: 2025-06-06 | Stop reason: HOSPADM

## 2025-06-06 RX ORDER — DROPERIDOL 2.5 MG/ML
0.62 INJECTION, SOLUTION INTRAMUSCULAR; INTRAVENOUS
Status: DISCONTINUED | OUTPATIENT
Start: 2025-06-06 | End: 2025-06-06 | Stop reason: HOSPADM

## 2025-06-06 RX ORDER — IPRATROPIUM BROMIDE AND ALBUTEROL SULFATE 2.5; .5 MG/3ML; MG/3ML
1 SOLUTION RESPIRATORY (INHALATION)
Status: DISCONTINUED | OUTPATIENT
Start: 2025-06-06 | End: 2025-06-06 | Stop reason: HOSPADM

## 2025-06-06 RX ORDER — PROCHLORPERAZINE EDISYLATE 5 MG/ML
5 INJECTION INTRAMUSCULAR; INTRAVENOUS
Status: DISCONTINUED | OUTPATIENT
Start: 2025-06-06 | End: 2025-06-06 | Stop reason: HOSPADM

## 2025-06-06 RX ORDER — BUPIVACAINE HYDROCHLORIDE 5 MG/ML
INJECTION, SOLUTION PERINEURAL PRN
Status: DISCONTINUED | OUTPATIENT
Start: 2025-06-06 | End: 2025-06-06 | Stop reason: ALTCHOICE

## 2025-06-06 RX ORDER — ONDANSETRON 2 MG/ML
INJECTION INTRAMUSCULAR; INTRAVENOUS
Status: DISCONTINUED | OUTPATIENT
Start: 2025-06-06 | End: 2025-06-06 | Stop reason: SDUPTHER

## 2025-06-06 RX ORDER — DIPHENHYDRAMINE HYDROCHLORIDE 50 MG/ML
12.5 INJECTION, SOLUTION INTRAMUSCULAR; INTRAVENOUS
Status: DISCONTINUED | OUTPATIENT
Start: 2025-06-06 | End: 2025-06-06 | Stop reason: HOSPADM

## 2025-06-06 RX ORDER — SODIUM CHLORIDE 9 MG/ML
INJECTION, SOLUTION INTRAVENOUS PRN
Status: DISCONTINUED | OUTPATIENT
Start: 2025-06-06 | End: 2025-06-06 | Stop reason: HOSPADM

## 2025-06-06 RX ORDER — DEXMEDETOMIDINE HYDROCHLORIDE 100 UG/ML
INJECTION, SOLUTION INTRAVENOUS
Status: DISCONTINUED | OUTPATIENT
Start: 2025-06-06 | End: 2025-06-06 | Stop reason: SDUPTHER

## 2025-06-06 RX ORDER — MEPERIDINE HYDROCHLORIDE 25 MG/ML
12.5 INJECTION INTRAMUSCULAR; INTRAVENOUS; SUBCUTANEOUS EVERY 5 MIN PRN
Status: DISCONTINUED | OUTPATIENT
Start: 2025-06-06 | End: 2025-06-06 | Stop reason: HOSPADM

## 2025-06-06 RX ORDER — HYDRALAZINE HYDROCHLORIDE 20 MG/ML
10 INJECTION INTRAMUSCULAR; INTRAVENOUS
Status: DISCONTINUED | OUTPATIENT
Start: 2025-06-06 | End: 2025-06-06 | Stop reason: HOSPADM

## 2025-06-06 RX ADMIN — SODIUM CHLORIDE: 9 INJECTION, SOLUTION INTRAVENOUS at 13:54

## 2025-06-06 RX ADMIN — FENTANYL CITRATE 50 MCG: 50 INJECTION, SOLUTION INTRAMUSCULAR; INTRAVENOUS at 14:01

## 2025-06-06 RX ADMIN — DEXMEDETOMIDINE HYDROCHLORIDE 10 MCG: 100 INJECTION, SOLUTION INTRAVENOUS at 14:20

## 2025-06-06 RX ADMIN — ONDANSETRON 4 MG: 2 INJECTION INTRAMUSCULAR; INTRAVENOUS at 13:58

## 2025-06-06 RX ADMIN — PROPOFOL 75 MCG/KG/MIN: 10 INJECTION, EMULSION INTRAVENOUS at 13:58

## 2025-06-06 RX ADMIN — FENTANYL CITRATE 50 MCG: 50 INJECTION, SOLUTION INTRAMUSCULAR; INTRAVENOUS at 13:58

## 2025-06-06 RX ADMIN — MIDAZOLAM 2 MG: 1 INJECTION INTRAMUSCULAR; INTRAVENOUS at 13:54

## 2025-06-06 ASSESSMENT — PAIN - FUNCTIONAL ASSESSMENT: PAIN_FUNCTIONAL_ASSESSMENT: 0-10

## 2025-06-06 ASSESSMENT — PAIN DESCRIPTION - DESCRIPTORS: DESCRIPTORS: DISCOMFORT

## 2025-06-06 ASSESSMENT — LIFESTYLE VARIABLES: SMOKING_STATUS: 0

## 2025-06-06 NOTE — ANESTHESIA PRE PROCEDURE
discussed with patient.      Plan discussed with CRNA and attending.                  Dulce Maria Rodriguez RN   6/6/2025    DOS STAFF ADDENDUM:    Pt seen and examined, chart reviewed (including anesthesia, drug and allergy history).       Anesthetic plan, risks, benefits, alternatives, and personnel involved discussed with patient.  Patient verbalized an understanding and agrees to proceed.  Plan discussed with care team members and agreed upon.    Lloyd Handy MD  Staff Anesthesiologist  1:23 PM

## 2025-06-06 NOTE — OP NOTE
Operative Note      Patient: Mari Rainey  YOB: 1970  MRN: [id number]    Date of Procedure: 6/6/2025    Pre-Op Diagnosis Codes:      * Pressure ulcer of both feet, stage 3 (HCC) [L89.893]    Post-Op Diagnosis: Same       Procedure(s):  WOUND DEBRIDEMENT WITH APPLICATION SKIN GRAFT SUBSTITUTE (GRAFIX) BOTH GREAT TOES (CURETTES, GRAFIX, STEM CELL)    Surgeon(s):  Tyrese Gil DPM Scanlon, Robert, DPM    Assistant:   * No surgical staff found *    Anesthesia: Monitor Anesthesia Care    Estimated Blood Loss (mL): Minimal    Complications: None    Specimens:   * No specimens in log *    Implants:  Implant Name Type Inv. Item Serial No.  Lot No. LRB No. Used Action   GRAFT SKIN GRAFIX PL PRIME 3X4CM LYPOPRESERVED - [id number]  GRAFT SKIN GRAFIX PL PRIME 3X4CM LYPOPRESERVED [id number] [employer]- [id number] Left 1 Implanted   GRAFT SKIN GRAFIX PL PRIME 2X3CM LYPOPRESERVED - [id number]  GRAFT SKIN GRAFIX PL PRIME 2X3CM LYPOPRESERVED [id number] [employer]- [id number] Right 1 Implanted         Drains: * No LDAs found *    Findings:  Infection Present At Time Of Surgery (PATOS) (choose all levels that have infection present):  No infection present  Other Findings: Wound measurements as follows:  Distal left great toe: 0.3 cm x 0.1 cm  Plantar left great toe: 1.3 cm x 1.2 cm  Plantar right great toe: 1.5 cm x 1.2 cm  Other findings as presented in Op-Note    Detailed Description of Procedure:       INDICATIONS FOR PROCEDURE: This patient has signs and symptoms clinically consistent with the above mentioned preoperative diagnosis. Having failed conservative treatment, it was determined that the patient would benefit from surgical intervention. All potential risks, benefits, and complications were discussed with the patient prior to the scheduling of surgery. The patient wished to proceed with surgery, and informed written consent was obtained.     DETAILS OF PROCEDURE: The  at 2:40 PM

## 2025-06-06 NOTE — DISCHARGE INSTRUCTIONS
Wound Care Instructions  Leave primary dressings (adaptic and steri strips) intact and undisturbed  OK to change secondary dressings (Gauze and ACE) daily/as needed

## 2025-06-06 NOTE — ANESTHESIA POSTPROCEDURE EVALUATION
Department of Anesthesiology  Postprocedure Note    Patient: Mari Rainey  MRN: [id number]  YOB: 1970  Date of evaluation: 6/6/2025    Procedure Summary       Date: 06/06/25 Room / Location: [address]    Anesthesia Start: 1354 Anesthesia Stop: 1440    Procedure: WOUND DEBRIDEMENT WITH APPLICATION SKIN GRAFT SUBSTITUTE (GRAFIX) BOTH GREAT TOES (CURETTES, GRAFIX, STEM CELL) (Bilateral: Foot) Diagnosis:       Pressure ulcer of both feet, stage 3 (HCC)      (Pressure ulcer of both feet, stage 3 (HCC) [L89.893])    Surgeons: Tyrese Gil DPM Responsible Provider: Lloyd Handy MD    Anesthesia Type: MAC ASA Status: 3            Anesthesia Type: No value filed.    Davida Phase I: Davida Score: 10    Davida Phase II:      Anesthesia Post Evaluation    Patient location during evaluation: PACU  Patient participation: complete - patient participated  Level of consciousness: awake  Airway patency: patent  Nausea & Vomiting: no nausea and no vomiting  Cardiovascular status: hemodynamically stable  Respiratory status: acceptable  Hydration status: euvolemic  Pain management: adequate    No notable events documented.

## 2025-06-06 NOTE — H&P
Chief Complaint of:  Nonhealing wound requiring debridement with application of skin graft substitute to the b/l hallux    Present Illness:  Patient seen today for wound debridement with application of skin graft substitute to the b/l hallux.  Patient has failed past wound care measures and elects for skin graft substitute.  No signs of infection noted.    Past Medical History:   Diagnosis Date    Anxiety and depression     Decreased dorsalis pedis pulse 10/01/2024    Diabetes mellitus (HCC)     Diabetic ulcer of toe of right foot associated with type 2 diabetes mellitus, with fat layer exposed (HCC) 10/01/2024    Hyperlipidemia     Kidney stone     Syncopal episodes     vasodepressor mediated origin    Tinea cruris 10/01/2024    Tinea pedis of both feet 10/01/2024      Past Surgical History:   Procedure Laterality Date    APPENDECTOMY      CHOLECYSTECTOMY      COLONOSCOPY      ENDOSCOPY, COLON, DIAGNOSTIC      HYSTERECTOMY (CERVIX STATUS UNKNOWN)      OTHER SURGICAL HISTORY  12/11/2017    robotic assisted laparoscopic total abdominal hysterectomy with bilateral salpinectomy and cystoscopy    TONSILLECTOMY      TUBAL LIGATION  1991      Family History   Problem Relation Age of Onset    Other Mother         fibromyalgia    Heart Disease Mother     Other Brother         Fibromyalgia    Schizophrenia Brother     Bipolar Disorder Brother       Allergies   Allergen Reactions    Valium [Diazepam] Nausea And Vomiting and Headaches      No current facility-administered medications on file prior to encounter.     Current Outpatient Medications on File Prior to Encounter   Medication Sig Dispense Refill    potassium bicarbonate (EFFER-K) 25 MEQ disintegrating tablet Take 1 tablet by mouth 2 times daily for 7 days 14 tablet 0    escitalopram (LEXAPRO) 10 MG tablet Take 2 tablets by mouth daily      omeprazole (PRILOSEC) 40 MG delayed release capsule Take 1 capsule by mouth daily      MOUNJARO 10 MG/0.5ML SOPN SC injection  Inject 0.5 mLs into the skin once a week On sundays      miconazole nitrate 2 % OINT Apply topically 2 times daily Please apply to the toes, in between the toes, both feet and both groins twice a day for 1 month 1 each 10    gabapentin (NEURONTIN) 300 MG capsule Take 3 capsules by mouth 3 times daily.      dapagliflozin (FARXIGA) 10 MG tablet Take 1 tablet by mouth every morning      rosuvastatin (CRESTOR) 10 MG tablet Take 1 tablet by mouth nightly      Multiple Vitamins-Minerals (THERAPEUTIC MULTIVITAMIN-MINERALS) tablet Take 1 tablet by mouth daily      VITAMIN D PO Take 1 tablet by mouth daily      Cyanocobalamin (B-12 PO) Take 1 tablet by mouth daily      hydrOXYzine (VISTARIL) 25 MG capsule Take 1 capsule by mouth nightly      traZODone (DESYREL) 50 MG tablet Take 1 tablet by mouth nightly          PHYSICAL EXAMINATION/GENERAL APPEARANCE:  No change from PCP H&P.    HEAD: Normocephalic. No masses, lesions, tenderness or abnormalities    HEART: PMI normal. No lifts, heaves, or thrills. RRR. No murmurs, clicks, gallops, or rubs    LUNGS: clear    ABDOMEN: Abdomen soft, non-tender. BS normal. No masses,  No organomegaly    OTHER SIGNIFICANT FINDINGS:  Pedal pulses palpable. Protective sensation is absent distally.  B/l hallux ulcers    IMPRESSION/INITIAL DIAGNOSIS:  Nonhealing wound to the b/l hallux    All risks, benefits, alternatives and reasonable complications were discussed with the pt to the pts understanding.  No guarantees were given or expressed.  All questions were answered to the pts understanding and satisfaction. Informed consent was signed, witnessed and placed in the patient's chart. Surgical site was confirmed to be correct by the patient, and appropriately marked in a visible location proximal to the surgical site. Patient would like to proceed with the procedure as planned.

## 2025-06-06 NOTE — BRIEF OP NOTE
Brief Postoperative Note      Patient: Mari Rainey  YOB: 1970  MRN: [id number]    Date of Procedure: 6/6/2025    Pre-Op Diagnosis Codes:      * Pressure ulcer of both feet, stage 3 (HCC) [L89.893]    Post-Op Diagnosis: Same       Procedure(s):  WOUND DEBRIDEMENT WITH APPLICATION SKIN GRAFT SUBSTITUTE (GRAFIX) BOTH GREAT TOES (CURETTES, GRAFIX, STEM CELL)    Surgeon(s):  Tyrese Gil DPM Scanlon, Robert, DPM    Assistant:  * No surgical staff found *    Anesthesia: Monitor Anesthesia Care    Estimated Blood Loss (mL): Minimal    Complications: None    Specimens:   * No specimens in log *    Implants:  Implant Name Type Inv. Item Serial No.  Lot No. LRB No. Used Action   GRAFT SKIN GRAFIX PL PRIME 3X4CM LYPOPRESERVED - [id number]  GRAFT SKIN GRAFIX PL PRIME 3X4CM LYPOPRESERVED [id number] [employer]- [id number] Left 1 Implanted   GRAFT SKIN GRAFIX PL PRIME 2X3CM LYPOPRESERVED - [id number]  GRAFT SKIN GRAFIX PL PRIME 2X3CM LYPOPRESERVED [id number] [employer]- [id number] Right 1 Implanted         Drains: * No LDAs found *    Findings:  Infection Present At Time Of Surgery (PATOS) (choose all levels that have infection present):  No infection present  Other Findings: Wound measurements as follows:  Distal left great toe: 0.3 cm x 0.1 cm  Plantar left great toe: 1.3 cm x 1.2 cm  Plantar right great toe: 1.5 cm x 1.2 cm  Other findings as presented in Op-Note    Electronically signed by Felice Lund DPM on 6/6/2025 at 2:38 PM

## 2025-07-16 RX ORDER — SODIUM CHLORIDE 9 MG/ML
INJECTION, SOLUTION INTRAVENOUS CONTINUOUS
OUTPATIENT
Start: 2025-07-23

## 2025-07-17 ENCOUNTER — HOSPITAL ENCOUNTER (OUTPATIENT)
Dept: PREADMISSION TESTING | Age: 55
Discharge: HOME | End: 2025-07-17
Payer: COMMERCIAL

## 2025-07-17 VITALS
TEMPERATURE: 97.9 F | DIASTOLIC BLOOD PRESSURE: 97 MMHG | HEART RATE: 80 BPM | RESPIRATION RATE: 18 BRPM | WEIGHT: 226 LBS | SYSTOLIC BLOOD PRESSURE: 126 MMHG | OXYGEN SATURATION: 100 % | HEIGHT: 64 IN | BODY MASS INDEX: 38.58 KG/M2

## 2025-07-17 DIAGNOSIS — L89.893: Primary | ICD-10-CM

## 2025-07-17 LAB
ANION GAP SERPL CALCULATED.3IONS-SCNC: 10 MMOL/L (ref 7–16)
BASOPHILS # BLD: 0.05 K/UL (ref 0–0.2)
BASOPHILS NFR BLD: 1 % (ref 0–2)
BUN SERPL-MCNC: 8 MG/DL (ref 6–20)
CALCIUM SERPL-MCNC: 8.9 MG/DL (ref 8.6–10)
CHLORIDE SERPL-SCNC: 105 MMOL/L (ref 98–107)
CO2 SERPL-SCNC: 27 MMOL/L (ref 22–29)
CREAT SERPL-MCNC: 0.8 MG/DL (ref 0.5–1)
EOSINOPHIL # BLD: 0.11 K/UL (ref 0.05–0.5)
EOSINOPHILS RELATIVE PERCENT: 1 % (ref 0–6)
ERYTHROCYTE [DISTWIDTH] IN BLOOD BY AUTOMATED COUNT: 13.5 % (ref 11.5–15)
GFR, ESTIMATED: >90 ML/MIN/1.73M2
GLUCOSE SERPL-MCNC: 119 MG/DL (ref 74–99)
HCT VFR BLD AUTO: 43.6 % (ref 34–48)
HGB BLD-MCNC: 14.1 G/DL (ref 11.5–15.5)
IMM GRANULOCYTES # BLD AUTO: 0.03 K/UL (ref 0–0.58)
IMM GRANULOCYTES NFR BLD: 0 % (ref 0–5)
LYMPHOCYTES NFR BLD: 2.52 K/UL (ref 1.5–4)
LYMPHOCYTES RELATIVE PERCENT: 26 % (ref 20–42)
MCH RBC QN AUTO: 30.5 PG (ref 26–35)
MCHC RBC AUTO-ENTMCNC: 32.3 G/DL (ref 32–34.5)
MCV RBC AUTO: 94.4 FL (ref 80–99.9)
MONOCYTES NFR BLD: 0.47 K/UL (ref 0.1–0.95)
MONOCYTES NFR BLD: 5 % (ref 2–12)
NEUTROPHILS NFR BLD: 67 % (ref 43–80)
NEUTS SEG NFR BLD: 6.54 K/UL (ref 1.8–7.3)
PLATELET # BLD AUTO: 254 K/UL (ref 130–450)
PMV BLD AUTO: 11.3 FL (ref 7–12)
POTASSIUM SERPL-SCNC: 3.7 MMOL/L (ref 3.5–5.1)
RBC # BLD AUTO: 4.62 M/UL (ref 3.5–5.5)
SODIUM SERPL-SCNC: 142 MMOL/L (ref 136–145)
WBC OTHER # BLD: 9.7 K/UL (ref 4.5–11.5)

## 2025-07-17 PROCEDURE — 80048 BASIC METABOLIC PNL TOTAL CA: CPT

## 2025-07-17 PROCEDURE — 85025 COMPLETE CBC W/AUTO DIFF WBC: CPT

## 2025-07-17 ASSESSMENT — PAIN DESCRIPTION - PAIN TYPE: TYPE: CHRONIC PAIN

## 2025-07-17 ASSESSMENT — PAIN - FUNCTIONAL ASSESSMENT: PAIN_FUNCTIONAL_ASSESSMENT: PREVENTS OR INTERFERES WITH ALL ACTIVE AND SOME PASSIVE ACTIVITIES

## 2025-07-17 ASSESSMENT — PAIN DESCRIPTION - FREQUENCY: FREQUENCY: CONTINUOUS

## 2025-07-17 ASSESSMENT — PAIN DESCRIPTION - LOCATION: LOCATION: TOE (COMMENT WHICH ONE)

## 2025-07-17 ASSESSMENT — PAIN SCALES - GENERAL: PAINLEVEL_OUTOF10: 8

## 2025-07-17 ASSESSMENT — PAIN DESCRIPTION - ONSET: ONSET: AWAKENED FROM SLEEP

## 2025-07-17 ASSESSMENT — PAIN DESCRIPTION - DESCRIPTORS: DESCRIPTORS: ACHING;BURNING;THROBBING

## 2025-07-17 ASSESSMENT — PAIN DESCRIPTION - ORIENTATION: ORIENTATION: LEFT;RIGHT

## 2025-07-17 NOTE — PROGRESS NOTES
[facility]                                                                                                                    PRE OP INSTRUCTIONS FOR  Mari Rainey        Date: 7/17/2025    Date of surgery: 7/23/25   Arrival Time: Hospital will call you between 5pm and 7pm with your final arrival time for surgery    You may drink clear liquids up until 2 hours before your procedure. Clear liquids include water, black coffee, and apple juice. No solid foods for 8 hours pre procedure.       Take the following medications with a small sip of water on the morning of Surgery: buspar, lexapro, gabapentin, omeprazole     Diabetics   If you feel symptomatic or low blood sugar morning of surgery drink 1-2 ounces of apple juice only.    Diabetic patients- SGLT2 inhibitors (Farxiga, Jardiance) must be discontinued for 3-4 days before surgery. GLP-1 agonists (Trulicity, Ozempic, Victoza) weekly injectables must be held for one week prior to surgery.      Aspirin, Ibuprofen, Advil, Naproxen, Vitamin E and other Anti-inflammatory products should be stopped  before surgery  as directed by your physician.  Take Tylenol only unless instructed otherwise by your surgeon. Stop all herbal supplements 5 days pre op.     Check with your Doctor regarding stopping Plavix, Coumadin, Lovenox, Eliquis, Effient, Xarelto, Pradaxa, Savaysa, Lixiana, or other blood thinners.    Do not smoke,use illicit drugs and do not drink any alcoholic beverages 24 hours prior to surgery.    You may brush your teeth the morning of surgery.      You MUST make arrangements for a responsible adult to take you home after your surgery. You will not be allowed to leave alone or drive yourself home.  It is strongly suggested someone stay with you the first 24 hrs. Your surgery will be cancelled if you do not have a ride home.      Please wear simple, loose fitting clothing to the hospital.  Do not bring valuables (money, credit cards,

## 2025-07-23 ENCOUNTER — ANESTHESIA (OUTPATIENT)
Dept: OPERATING ROOM | Age: 55
End: 2025-07-23
Payer: COMMERCIAL

## 2025-07-23 ENCOUNTER — HOSPITAL ENCOUNTER (OUTPATIENT)
Age: 55
Setting detail: OUTPATIENT SURGERY
Discharge: HOME | End: 2025-07-23
Attending: PODIATRIST | Admitting: PODIATRIST
Payer: COMMERCIAL

## 2025-07-23 ENCOUNTER — ANESTHESIA EVENT (OUTPATIENT)
Dept: OPERATING ROOM | Age: 55
End: 2025-07-23
Payer: COMMERCIAL

## 2025-07-23 VITALS
DIASTOLIC BLOOD PRESSURE: 64 MMHG | BODY MASS INDEX: 38.58 KG/M2 | HEIGHT: 64 IN | RESPIRATION RATE: 18 BRPM | HEART RATE: 55 BPM | WEIGHT: 226 LBS | OXYGEN SATURATION: 99 % | TEMPERATURE: 97.7 F | SYSTOLIC BLOOD PRESSURE: 120 MMHG

## 2025-07-23 LAB — GLUCOSE BLD-MCNC: 174 MG/DL (ref 74–99)

## 2025-07-23 PROCEDURE — 3600000002 HC SURGERY LEVEL 2 BASE: Performed by: PODIATRIST

## 2025-07-23 PROCEDURE — 7100000011 HC PHASE II RECOVERY - ADDTL 15 MIN: Performed by: PODIATRIST

## 2025-07-23 PROCEDURE — 76937 US GUIDE VASCULAR ACCESS: CPT

## 2025-07-23 PROCEDURE — 2580000003 HC RX 258: Performed by: ANESTHESIOLOGY

## 2025-07-23 PROCEDURE — 82962 GLUCOSE BLOOD TEST: CPT

## 2025-07-23 PROCEDURE — 3700000000 HC ANESTHESIA ATTENDED CARE: Performed by: PODIATRIST

## 2025-07-23 PROCEDURE — 3700000001 HC ADD 15 MINUTES (ANESTHESIA): Performed by: PODIATRIST

## 2025-07-23 PROCEDURE — 6360000002 HC RX W HCPCS: Performed by: NURSE ANESTHETIST, CERTIFIED REGISTERED

## 2025-07-23 PROCEDURE — 7100000010 HC PHASE II RECOVERY - FIRST 15 MIN: Performed by: PODIATRIST

## 2025-07-23 PROCEDURE — 6360000002 HC RX W HCPCS: Performed by: PODIATRIST

## 2025-07-23 PROCEDURE — 2709999900 HC NON-CHARGEABLE SUPPLY: Performed by: PODIATRIST

## 2025-07-23 PROCEDURE — 3600000012 HC SURGERY LEVEL 2 ADDTL 15MIN: Performed by: PODIATRIST

## 2025-07-23 DEVICE — IMPLANTABLE DEVICE: Type: IMPLANTABLE DEVICE | Site: FOOT | Status: FUNCTIONAL

## 2025-07-23 DEVICE — GRAFT HUM TISS W5XL5CM PLCNTA AMNIO MEMBRN LYOPRESERVED FLEX: Type: IMPLANTABLE DEVICE | Site: FOOT | Status: FUNCTIONAL

## 2025-07-23 RX ORDER — MIDAZOLAM HYDROCHLORIDE 1 MG/ML
INJECTION, SOLUTION INTRAMUSCULAR; INTRAVENOUS
Status: DISCONTINUED | OUTPATIENT
Start: 2025-07-23 | End: 2025-07-23 | Stop reason: SDUPTHER

## 2025-07-23 RX ORDER — PROPOFOL 10 MG/ML
INJECTION, EMULSION INTRAVENOUS
Status: DISCONTINUED | OUTPATIENT
Start: 2025-07-23 | End: 2025-07-23 | Stop reason: SDUPTHER

## 2025-07-23 RX ORDER — BUPIVACAINE HYDROCHLORIDE 5 MG/ML
INJECTION, SOLUTION PERINEURAL PRN
Status: DISCONTINUED | OUTPATIENT
Start: 2025-07-23 | End: 2025-07-23 | Stop reason: ALTCHOICE

## 2025-07-23 RX ORDER — SODIUM CHLORIDE 9 MG/ML
INJECTION, SOLUTION INTRAVENOUS CONTINUOUS
Status: DISCONTINUED | OUTPATIENT
Start: 2025-07-23 | End: 2025-07-23 | Stop reason: HOSPADM

## 2025-07-23 RX ADMIN — SODIUM CHLORIDE: 9 INJECTION, SOLUTION INTRAVENOUS at 14:36

## 2025-07-23 RX ADMIN — MIDAZOLAM 1 MG: 1 INJECTION INTRAMUSCULAR; INTRAVENOUS at 14:37

## 2025-07-23 RX ADMIN — MIDAZOLAM 1 MG: 1 INJECTION INTRAMUSCULAR; INTRAVENOUS at 14:40

## 2025-07-23 RX ADMIN — PROPOFOL 100 MCG/KG/MIN: 10 INJECTION, EMULSION INTRAVENOUS at 14:40

## 2025-07-23 RX ADMIN — PROPOFOL 20 MG: 10 INJECTION, EMULSION INTRAVENOUS at 14:47

## 2025-07-23 ASSESSMENT — PAIN - FUNCTIONAL ASSESSMENT: PAIN_FUNCTIONAL_ASSESSMENT: 0-10

## 2025-07-23 ASSESSMENT — LIFESTYLE VARIABLES: SMOKING_STATUS: 0

## 2025-07-23 ASSESSMENT — PAIN DESCRIPTION - DESCRIPTORS: DESCRIPTORS: DISCOMFORT

## 2025-07-23 NOTE — DISCHARGE INSTRUCTIONS
Wound care orders:  -Keep dressing clean dry and intact  -Leave primary dressing (Adaptic and steri-strips) intact and undisturbed-Reinforce as needed  -OK to change secondary dressing daily and as needed.   - Weight bare as tolerated with post op shoe(s)  -Elevate operative extremities(s)  -Ice to operative site(s)

## 2025-07-23 NOTE — H&P
Chief Complaint of:  Nonhealing wound requiring debridement with application of skin graft substitute to the b/l hallux    Present Illness:  Patient seen today for wound debridement with application of skin graft substitute to the b/l hallux.  Patient has failed past wound care measures and elects for skin graft substitute.  No signs of infection noted.    Past Medical History:   Diagnosis Date    Anxiety and depression     Decreased dorsalis pedis pulse 10/01/2024    Diabetes mellitus (HCC)     Diabetic ulcer of toe of right foot associated with type 2 diabetes mellitus, with fat layer exposed (HCC) 10/01/2024    Hyperlipidemia     Kidney stone     Syncopal episodes     vasodepressor mediated origin    Tinea cruris 10/01/2024    Tinea pedis of both feet 10/01/2024      Past Surgical History:   Procedure Laterality Date    APPENDECTOMY      CHOLECYSTECTOMY      COLONOSCOPY      ENDOSCOPY, COLON, DIAGNOSTIC      FOOT DEBRIDEMENT Bilateral 6/6/2025    WOUND DEBRIDEMENT WITH APPLICATION SKIN GRAFT SUBSTITUTE (GRAFIX) BOTH GREAT TOES (CURETTES, GRAFIX, STEM CELL) performed by Tyrese Gil DPM at [facility] OR    HYSTERECTOMY (CERVIX STATUS UNKNOWN)      OTHER SURGICAL HISTORY  12/11/2017    robotic assisted laparoscopic total abdominal hysterectomy with bilateral salpinectomy and cystoscopy    TONSILLECTOMY      TUBAL LIGATION  1991      Family History   Problem Relation Age of Onset    Other Mother         fibromyalgia    Heart Disease Mother     Other Brother         Fibromyalgia    Schizophrenia Brother     Bipolar Disorder Brother       Allergies   Allergen Reactions    Valium [Diazepam] Nausea And Vomiting and Headaches      No current facility-administered medications on file prior to encounter.     Current Outpatient Medications on File Prior to Encounter   Medication Sig Dispense Refill    busPIRone (BUSPAR) 5 MG tablet Take 1 tablet by mouth 3 times daily as needed      escitalopram (LEXAPRO) 10 MG tablet Take 2

## 2025-07-23 NOTE — ANESTHESIA POSTPROCEDURE EVALUATION
Department of Anesthesiology  Postprocedure Note    Patient: Mari Rainey  MRN: [id number]  YOB: 1970  Date of evaluation: 7/23/2025    Procedure Summary       Date: 07/23/25 Room / Location: [address]    Anesthesia Start: 1437 Anesthesia Stop: 1506    Procedure: WOUND DEBRIDEMENT WITH APPLICATION OF SKIN GRAFT SUBSTITUTE (GRAFIX) BOTH GREAT TOES (CPT 85486) (Bilateral: Foot) Diagnosis:       Pressure injury of foot, stage 3, unspecified laterality (HCC)      (Pressure injury of foot, stage 3, unspecified laterality (HCC) [L89.893])    Surgeons: Tyrese Gil DPM Responsible Provider: Lloyd Handy MD    Anesthesia Type: MAC ASA Status: 3            Anesthesia Type: No value filed.    Davida Phase I: Davida Score: 10    Davida Phase II: Davida Score: 10    Anesthesia Post Evaluation    Patient location during evaluation: PACU  Patient participation: complete - patient participated  Level of consciousness: awake  Airway patency: patent  Nausea & Vomiting: no nausea and no vomiting  Cardiovascular status: hemodynamically stable  Respiratory status: acceptable  Hydration status: euvolemic  Pain management: adequate        No notable events documented.

## 2025-07-23 NOTE — BRIEF OP NOTE
Brief Postoperative Note      Patient: Mari Rainey  YOB: 1970  MRN: [id number]    Date of Procedure: 7/23/2025    Pre-Op Diagnosis Codes:      * Pressure injury of foot, stage 3, unspecified laterality (HCC) [L89.893]    Post-Op Diagnosis: Same       Procedure(s):  WOUND DEBRIDEMENT WITH APPLICATION OF SKIN GRAFT SUBSTITUTE (GRAFIX) BOTH GREAT TOES (CPT 08278)    Surgeon(s):  Tyrese Gil DPM Kubala, Daniel E DPM    Assistant:  * No surgical staff found *    Anesthesia: Monitor Anesthesia Care    Estimated Blood Loss (mL): Minimal    Complications: None    Specimens:   * No specimens in log *    Implants:  Implant Name Type Inv. Item Serial No.  Lot No. LRB No. Used Action   GRAFT SKIN GRAFIX PL PRIME 2X3CM LYPOPRESERVED - [id number]  GRAFT SKIN GRAFIX PL PRIME 2X3CM LYPOPRESERVED [id number] [employer] [id number] Left 1 Implanted   GRAFT HUM TISS [id number] PLCNTA AMNIO MEMBRN LYOPRESERVED FLEX - [id number]  GRAFT HUM TISS [id number] PLCNTA AMNIO MEMBRN LYOPRESERVED FLEX [id number] [employer] [id number] Right 1 Implanted         Drains: * No LDAs found *    Findings:  Present At Time Of Surgery (PATOS) (choose all levels that have infection present):  No infection present  Other Findings: Left hallux wound 0.3x0.1x0.2cm. Right hallux wound 0.5x0.4x0.3. Right distal hallux wound split thickness 4.0x2.7x0.1cm    Electronically signed by Rodolfo Recinos DPM on 7/23/2025 at 3:11 PM

## 2025-07-23 NOTE — ANESTHESIA PRE PROCEDURE
intravenous.    MIPS: Prophylactic antiemetics administered.  Anesthetic plan and risks discussed with patient.      Plan discussed with CRNA and attending.                  DOS STAFF ADDENDUM:    Pt seen and examined, chart reviewed (including anesthesia, drug and allergy history).       Anesthetic plan, risks, benefits, alternatives, and personnel involved discussed with patient.  Patient verbalized an understanding and agrees to proceed.  Plan discussed with care team members and agreed upon.    Lloyd Handy MD  Staff Anesthesiologist  1:10 PM

## (undated) DEVICE — AMD ANTIMICROBIAL SUPER SPONGES,MEDIUM: Brand: KERLIX

## (undated) DEVICE — SYRINGE,EAR/ULCER, 2 OZ, STERILE: Brand: MEDLINE

## (undated) DEVICE — DRESSING,GAUZE,OIL EMLSN,CURAD,3X8",3/PK: Brand: CURAD

## (undated) DEVICE — PACK,EXTREMITY I: Brand: MEDLINE

## (undated) DEVICE — BLADE,STAINLESS-STEEL,10,STRL,DISPOSABLE: Brand: MEDLINE

## (undated) DEVICE — COVER,LIGHT HANDLE,FLX,1/PK: Brand: MEDLINE INDUSTRIES, INC.

## (undated) DEVICE — DRESSING STERILE PETRO W3XL8IN N ADH OIL EMUL GZ CURAD

## (undated) DEVICE — WIPES SKIN CLOTH READYPREP 9 X 10.5 IN 2% CHLORHEX GLUCONATE CHG PREOP

## (undated) DEVICE — SYRINGE MED 10ML LUERLOCK TIP W/O SFTY DISP

## (undated) DEVICE — GLOVE ORANGE PI 7 1/2   MSG9075

## (undated) DEVICE — BANDAGE NL COFLEX 4INX5YD: Brand: MEDLINE INDUSTRIES, INC.

## (undated) DEVICE — BASIC DOUBLE BASIN 2-LF: Brand: MEDLINE INDUSTRIES, INC.

## (undated) DEVICE — SWABS COTTON OB/GYN W/RAYON TIP 8 IN 2 PER PK

## (undated) DEVICE — TUBING, SUCTION, 1/4" X 10', STRAIGHT: Brand: MEDLINE

## (undated) DEVICE — TOWEL,OR,DSP,ST,BLUE,STD,6/PK,12PK/CS: Brand: MEDLINE

## (undated) DEVICE — 4-PORT MANIFOLD: Brand: NEPTUNE 2

## (undated) DEVICE — MARKER,SKIN,WI/RULER AND LABELS: Brand: MEDLINE

## (undated) DEVICE — NEEDLE HYPO 25GA L1.5IN BLU POLYPR HUB S STL REG BVL STR

## (undated) DEVICE — NDL CNTR 40CT FM MAG: Brand: MEDLINE INDUSTRIES, INC.

## (undated) DEVICE — GARMENT,MEDLINE,DVT,INT,CALF,MED, GEN2: Brand: MEDLINE

## (undated) DEVICE — GAUZE,SPONGE,4"X4",16PLY,XRAY,STRL,LF: Brand: MEDLINE

## (undated) DEVICE — GAUZE,SPONGE,4"X4",16PLY,STRL,LF,10/TRAY: Brand: MEDLINE

## (undated) DEVICE — GOWN,SIRUS,NONRNF,SETINSLV,XL,20/CS: Brand: MEDLINE